# Patient Record
Sex: FEMALE | Race: WHITE | NOT HISPANIC OR LATINO | Employment: FULL TIME | URBAN - METROPOLITAN AREA
[De-identification: names, ages, dates, MRNs, and addresses within clinical notes are randomized per-mention and may not be internally consistent; named-entity substitution may affect disease eponyms.]

---

## 2017-03-13 DIAGNOSIS — I10 ESSENTIAL HYPERTENSION WITH GOAL BLOOD PRESSURE LESS THAN 140/90: ICD-10-CM

## 2017-03-13 NOTE — TELEPHONE ENCOUNTER
Lisinopril -Hydrochlorothiazide  Last Written Prescription Date: 8-18-16  Last Fill Quantity: 90, # refills: 3  Last Office Visit with G, [facility] or [facility] prescribing provider: 8-18-16       Potassium   Date Value Ref Range Status   04/11/2016 3.7 3.4 - 5.3 mmol/L Final     Creatinine   Date Value Ref Range Status   04/11/2016 1.06 (H) 0.52 - 1.04 mg/dL Final     BP Readings from Last 3 Encounters:   08/18/16 100/66   08/18/16 100/66   08/16/16 98/72

## 2017-03-16 RX ORDER — LISINOPRIL/HYDROCHLOROTHIAZIDE 10-12.5 MG
0.5 TABLET ORAL DAILY
Qty: 90 TABLET | Refills: 3 | OUTPATIENT
Start: 2017-03-16

## 2017-03-27 NOTE — TELEPHONE ENCOUNTER
[facility] never received this prescriptin written in August.  LAst February is the last order they received.  Please resend.

## 2017-03-27 NOTE — PROGRESS NOTES
"  SUBJECTIVE:                                                    Ingrid Vickers is a 51 year old female who presents to clinic today for the following health issues:      History of Present Illness   Hypertension:     Outpatient blood pressures:  Are being checked    Blood pressures checked at:  Work    Dietary sodium intake::  Not monitoring salt intake      Problem list and histories reviewed & adjusted, as indicated.  Additional history: as documented    Patient Active Problem List   Diagnosis     Hypertension goal BP (blood pressure) < 140/90     Papanicolaou smear of cervix with low grade squamous intraepithelial lesion (LGSIL)     Past Surgical History:   Procedure Laterality Date     HC ABLATION, ENDOMETRIAL, THERMAL, W/O HYSTEROSCOPIC GUIDANCE       PHOTOREFRACTIVE KERATECTOMY         Social History   Substance Use Topics     Smoking status: Never Smoker     Smokeless tobacco: Never Used      Comment: no smokers in household     Alcohol use Yes      Comment: cocktails x 5/4x per month     Family History   Problem Relation Age of Onset     Hypertension Mother      Genitourinary Problems Mother      Hysterectomy due to pre-cancerous reasons cervical     HEART DISEASE Maternal Grandfather      CEREBROVASCULAR DISEASE Paternal Grandfather      Depression Father      manic-depressive     Psychotic Disorder Father      \"manic\"     Depression Sister      Depression Sister      Psychotic Disorder Son      manic           ROS:  C: NEGATIVE for fever, chills, change in weight  E/M: NEGATIVE for ear, mouth and throat problems  R: NEGATIVE for significant cough or SOB  CV: NEGATIVE for chest pain, palpitations or peripheral edema    OBJECTIVE:                                                    /82  Pulse 80  Temp 98.1  F (36.7  C) (Temporal)  Resp 12  Ht 5' 1.97\" (1.574 m)  Wt 135 lb (61.2 kg)  BMI 24.72 kg/m2  Body mass index is 24.72 kg/(m^2).  Gen: no apparent distress  NECK: no adenopathy, no " asymmetry, no masses  Chest: clear to auscultation without wheeze, rale or rhonchi  Cor: regular rate and rhythm without murmur  Ext: warm and dry without edema  Psych: Alert and oriented times 3; coherent speech, normal   rate and volume, able to articulate logical thoughts, able   to abstract reason, no tangential thoughts, no hallucinations   or delusions  Her affect is neutral        ASSESSMENT/PLAN:                                                      1. 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  BMP drawn today.  Refills given.  Follow up in 1 year.    - BASIC METABOLIC PANEL  - lisinopril-hydrochlorothiazide (PRINZIDE/ZESTORETIC) 10-12.5 MG per tablet; Take 1 tablet by mouth daily  Dispense: 90 tablet; Refill: 3    Lin Lanza MD  [facility]

## 2017-03-27 NOTE — TELEPHONE ENCOUNTER
[facility] is calling to check on the status of this request they were told that there should be refills on file she states they never got this please resend.     Thank you Gloria

## 2017-03-28 ENCOUNTER — TELEPHONE (OUTPATIENT)
Dept: FAMILY MEDICINE | Facility: OTHER | Age: 52
End: 2017-03-28

## 2017-03-28 RX ORDER — LISINOPRIL/HYDROCHLOROTHIAZIDE 10-12.5 MG
0.5 TABLET ORAL DAILY
Qty: 45 TABLET | Refills: 0 | Status: SHIPPED | OUTPATIENT
Start: 2017-03-28 | End: 2017-03-31

## 2017-03-28 NOTE — TELEPHONE ENCOUNTER
Spoke with Pharmacy regarding recent refill of lisinopril-HCTZ at 1/2 tablet. This change was made in August by Maria Teresa Ortiz but the Rx was never filled.     Left message for pt to make her aware of the change as TC refilled the 1/2 tablet.     Routing to TC to make sure this change is still ok as well.  Pharmacy states pt has been taking a full tablet this whole time.

## 2017-03-28 NOTE — TELEPHONE ENCOUNTER
I haven't seen patient since 2011 so was just going by what the chart said.  She should take whatever dose she has been taking and we'll discuss when I see her again on Friday.

## 2017-03-31 ENCOUNTER — OFFICE VISIT (OUTPATIENT)
Dept: FAMILY MEDICINE | Facility: OTHER | Age: 52
End: 2017-03-31
Payer: COMMERCIAL

## 2017-03-31 VITALS
WEIGHT: 135 LBS | DIASTOLIC BLOOD PRESSURE: 82 MMHG | SYSTOLIC BLOOD PRESSURE: 136 MMHG | HEIGHT: 62 IN | HEART RATE: 80 BPM | TEMPERATURE: 98.1 F | RESPIRATION RATE: 12 BRPM | BODY MASS INDEX: 24.84 KG/M2

## 2017-03-31 DIAGNOSIS — I10 HYPERTENSION GOAL BP (BLOOD PRESSURE) < 140/90: Primary | ICD-10-CM

## 2017-03-31 LAB
ANION GAP SERPL CALCULATED.3IONS-SCNC: 6 MMOL/L (ref 3–14)
BUN SERPL-MCNC: 18 MG/DL (ref 7–30)
CALCIUM SERPL-MCNC: 8.9 MG/DL (ref 8.5–10.1)
CHLORIDE SERPL-SCNC: 105 MMOL/L (ref 94–109)
CO2 SERPL-SCNC: 30 MMOL/L (ref 20–32)
CREAT SERPL-MCNC: 1.17 MG/DL (ref 0.52–1.04)
GFR SERPL CREATININE-BSD FRML MDRD: 49 ML/MIN/1.7M2
GLUCOSE SERPL-MCNC: 79 MG/DL (ref 70–99)
POTASSIUM SERPL-SCNC: 4.6 MMOL/L (ref 3.4–5.3)
SODIUM SERPL-SCNC: 141 MMOL/L (ref 133–144)

## 2017-03-31 PROCEDURE — 36415 COLL VENOUS BLD VENIPUNCTURE: CPT | Performed by: FAMILY MEDICINE

## 2017-03-31 PROCEDURE — 99213 OFFICE O/P EST LOW 20 MIN: CPT | Performed by: FAMILY MEDICINE

## 2017-03-31 PROCEDURE — 80048 BASIC METABOLIC PNL TOTAL CA: CPT | Performed by: FAMILY MEDICINE

## 2017-03-31 RX ORDER — LISINOPRIL/HYDROCHLOROTHIAZIDE 10-12.5 MG
1 TABLET ORAL DAILY
Qty: 90 TABLET | Refills: 3 | Status: SHIPPED | OUTPATIENT
Start: 2017-03-31 | End: 2018-04-12

## 2017-03-31 ASSESSMENT — PAIN SCALES - GENERAL: PAINLEVEL: NO PAIN (0)

## 2017-03-31 NOTE — MR AVS SNAPSHOT
"              After Visit Summary   3/31/2017    Ingrid Vickers    MRN: [id number]           Patient Information     Date Of Birth          1965        Visit Information        Provider Department      3/31/2017 10:20 AM Lin Lanza MD [facility]        Today's Diagnoses     Hypertension goal BP (blood pressure) < 140/90    -  1       Follow-ups after your visit        Who to contact     If you have questions or need follow up information about today's clinic visit or your schedule please contact [facility] directly at [phone].  Normal or non-critical lab and imaging results will be communicated to you by [employer], letter or phone within 4 business days after the clinic has received the results. If you do not hear from us within 7 days, please contact the clinic through [employer] or phone. If you have a critical or abnormal lab result, we will notify you by phone as soon as possible.  Submit refill requests through [employer] or call your pharmacy and they will forward the refill request to us. Please allow 3 business days for your refill to be completed.          Additional Information About Your Visit        MyChart Information     [employer] gives you secure access to your electronic health record. If you see a primary care provider, you can also send messages to your care team and make appointments. If you have questions, please call your primary care clinic.  If you do not have a primary care provider, please call [phone] and they will assist you.        Care EveryWhere ID     This is your Care EveryWhere ID. This could be used by other organizations to access your [place] medical records  [id number]        Your Vitals Were     Pulse Temperature Respirations Height BMI (Body Mass Index)       80 98.1  F (36.7  C) (Temporal) 12 5' 1.97\" (1.574 m) 24.72 kg/m2        Blood Pressure from Last 3 Encounters:   03/31/17 136/82   08/18/16 100/66   08/18/16 100/66 "    Weight from Last 3 Encounters:   03/31/17 135 lb (61.2 kg)   08/18/16 133 lb (60.3 kg)   08/18/16 133 lb (60.3 kg)              We Performed the Following     BASIC METABOLIC PANEL          Today's Medication Changes          These changes are accurate as of: 3/31/17 10:27 AM.  If you have any questions, ask your nurse or doctor.               These medicines have changed or have updated prescriptions.        Dose/Directions    lisinopril-hydrochlorothiazide 10-12.5 MG per tablet   Commonly known as:  PRINZIDE/ZESTORETIC   This may have changed:  how much to take   Used for:  Hypertension goal BP (blood pressure) < 140/90   Changed by:  Lin Lanza MD        Dose:  1 tablet   Take 1 tablet by mouth daily   Quantity:  90 tablet   Refills:  3            Where to get your medicines      These medications were sent to [facility] [address] [place] [address]     Phone:  [phone]     lisinopril-hydrochlorothiazide 10-12.5 MG per tablet                Primary Care Provider Office Phone # Fax #    Lin Lanza [phone]       [facility] [address]  [facility] [address]        Thank you!     Thank you for choosing [facility]  for your care. Our goal is always to provide you with excellent care. Hearing back from our patients is one way we can continue to improve our services. Please take a few minutes to complete the written survey that you may receive in the mail after your visit with us. Thank you!             Your Updated Medication List - Protect others around you: Learn how to safely use, store and throw away your medicines at www.disposemymeds.org.          This list is accurate as of: 3/31/17 10:27 AM.  Always use your most recent med list.                   Brand Name Dispense Instructions for use    lisinopril-hydrochlorothiazide 10-12.5 MG per tablet    PRINZIDE/ZESTORETIC     90 tablet    Take 1 tablet by mouth daily

## 2017-10-09 ENCOUNTER — TRANSFERRED RECORDS (OUTPATIENT)
Dept: HEALTH INFORMATION MANAGEMENT | Facility: CLINIC | Age: 52
End: 2017-10-09

## 2017-10-09 ENCOUNTER — NURSE TRIAGE (OUTPATIENT)
Dept: NURSING | Facility: CLINIC | Age: 52
End: 2017-10-09

## 2017-10-10 NOTE — TELEPHONE ENCOUNTER
"  Reason for Disposition    [1] SEVERE pain (e.g., excruciating) AND [2] present > 1 hour     \"About a few hours ago I started having lower right(only) abdominal pain. It feels really heavy and the pain is severe. I felt nauseated all day yesterday.\" Denies other sx. Advised ER.    Additional Information    Negative: Shock suspected (e.g., cold/pale/clammy skin, too weak to stand, low BP, rapid pulse)    Negative: Difficult to awaken or acting confused  (e.g., disoriented, slurred speech)    Negative: Passed out (i.e., lost consciousness, collapsed and was not responding)    Negative: Sounds like a life-threatening emergency to the triager    Negative: Chest pain    Negative: Pain is mainly in upper abdomen  (if needed ask: \"is it mainly above the belly button?\")    Negative: Followed an abdomen (stomach) injury    Negative: [1] Abdominal pain AND [2] pregnant < 20 weeks    Negative: [1] Abdominal pain AND [2] pregnant > 20 weeks    Negative: [1] Abdominal pain AND [2] postpartum < 1 month since delivery    Protocols used: ABDOMINAL PAIN - FEMALE-ADULT-    "

## 2017-10-17 ENCOUNTER — OFFICE VISIT (OUTPATIENT)
Dept: FAMILY MEDICINE | Facility: OTHER | Age: 52
End: 2017-10-17
Payer: COMMERCIAL

## 2017-10-17 VITALS
SYSTOLIC BLOOD PRESSURE: 110 MMHG | TEMPERATURE: 98.6 F | BODY MASS INDEX: 24.72 KG/M2 | HEART RATE: 64 BPM | WEIGHT: 135 LBS | DIASTOLIC BLOOD PRESSURE: 72 MMHG | RESPIRATION RATE: 16 BRPM

## 2017-10-17 DIAGNOSIS — B96.89 BACTERIAL SINUSITIS: Primary | ICD-10-CM

## 2017-10-17 DIAGNOSIS — J32.9 BACTERIAL SINUSITIS: Primary | ICD-10-CM

## 2017-10-17 PROCEDURE — 99213 OFFICE O/P EST LOW 20 MIN: CPT | Performed by: NURSE PRACTITIONER

## 2017-10-17 RX ORDER — AZITHROMYCIN 250 MG/1
TABLET, FILM COATED ORAL
Qty: 6 TABLET | Refills: 0 | Status: SHIPPED | OUTPATIENT
Start: 2017-10-17 | End: 2018-06-19

## 2017-10-17 NOTE — PATIENT INSTRUCTIONS
Please take antibiotic with a probiotic or yogurt (3 small containers) daily not directly with the antibiotic for optimal good bacterial protection. May use saline nasal spray to help thin out and remove mucous. May also increase humidity.     Return to clinic if symptoms worsen or do not improve with treatment    Thank you  Tammy Burr CNP

## 2017-10-17 NOTE — MR AVS SNAPSHOT
After Visit Summary   10/17/2017    Ingrid Vickers    MRN: [id number]           Patient Information     Date Of Birth          1965        Visit Information        Provider Department      10/17/2017 1:20 PM Tammy Burr APRN CNP [facility]        Today's Diagnoses     Bacterial sinusitis    -  1      Care Instructions    Please take antibiotic with a probiotic or yogurt (3 small containers) daily not directly with the antibiotic for optimal good bacterial protection. May use saline nasal spray to help thin out and remove mucous. May also increase humidity.     Return to clinic if symptoms worsen or do not improve with treatment    Thank you  Tammy Burr CNP            Follow-ups after your visit        Who to contact     If you have questions or need follow up information about today's clinic visit or your schedule please contact [facility] directly at [phone].  Normal or non-critical lab and imaging results will be communicated to you by [employer], letter or phone within 4 business days after the clinic has received the results. If you do not hear from us within 7 days, please contact the clinic through [employer] or phone. If you have a critical or abnormal lab result, we will notify you by phone as soon as possible.  Submit refill requests through [employer] or call your pharmacy and they will forward the refill request to us. Please allow 3 business days for your refill to be completed.          Additional Information About Your Visit        MyChart Information     [employer] gives you secure access to your electronic health record. If you see a primary care provider, you can also send messages to your care team and make appointments. If you have questions, please call your primary care clinic.  If you do not have a primary care provider, please call [phone] and they will assist you.        Care EveryWhere ID     This is your Care EveryWhere  ID. This could be used by other organizations to access your [place] medical records  [id number]        Your Vitals Were     Pulse Temperature Respirations BMI (Body Mass Index)          64 98.6  F (37  C) (Oral) 16 24.72 kg/m2         Blood Pressure from Last 3 Encounters:   10/17/17 110/72   03/31/17 136/82   08/18/16 100/66    Weight from Last 3 Encounters:   10/17/17 135 lb (61.2 kg)   03/31/17 135 lb (61.2 kg)   08/18/16 133 lb (60.3 kg)              Today, you had the following     No orders found for display         Today's Medication Changes          These changes are accurate as of: 10/17/17  1:39 PM.  If you have any questions, ask your nurse or doctor.               Start taking these medicines.        Dose/Directions    azithromycin 250 MG tablet   Commonly known as:  ZITHROMAX   Used for:  Bacterial sinusitis   Started by:  Tammy Burr APRN CNP        Two tablets first day, then one tablet daily for four days.   Quantity:  6 tablet   Refills:  0            Where to get your medicines      These medications were sent to [facility] [address] [place] [address]     Phone:  [phone]     azithromycin 250 MG tablet                Primary Care Provider Office Phone # Fax #    Lin FIFI Lanza [phone]       [address] [id number]        Equal Access to Services     [facility] AH: Tim Roman, max martinez, qaenio crandall [facility] campbell. So [facility] [phone].    ATENCIÓN: Si habla español, tiene a willoughby disposición servicios gratuitos de asistencia lingüística. Llame al [phone].    We comply with applicable federal civil rights laws and [place] laws. We do not discriminate on the basis of race, color, national origin, age, disability, sex, sexual orientation, or gender identity.            Thank you!     Thank you for  choosing [facility]  for your care. Our goal is always to provide you with excellent care. Hearing back from our patients is one way we can continue to improve our services. Please take a few minutes to complete the written survey that you may receive in the mail after your visit with us. Thank you!             Your Updated Medication List - Protect others around you: Learn how to safely use, store and throw away your medicines at www.disposemymeds.org.          This list is accurate as of: 10/17/17  1:39 PM.  Always use your most recent med list.                   Brand Name Dispense Instructions for use Diagnosis    azithromycin 250 MG tablet    ZITHROMAX    6 tablet    Two tablets first day, then one tablet daily for four days.    Bacterial sinusitis       lisinopril-hydrochlorothiazide 10-12.5 MG per tablet    PRINZIDE/ZESTORETIC    90 tablet    Take 1 tablet by mouth daily    Hypertension goal BP (blood pressure) < 140/90

## 2017-10-17 NOTE — PROGRESS NOTES
SUBJECTIVE:                                                    Ingrid Vickers is a 52 year old female who presents to clinic today for the following health issues:    HPI    Acute Illness   Acute illness concerns: cold  Onset: Wednesday     Fever: unknown     Chills/Sweats: YES    Headache (location?): no     Sinus Pressure:YES    Conjunctivitis:  YES- pain     Ear Pain: YES    Rhinorrhea: YES    Congestion: YES    Sore Throat: YES     Cough: YES just barely -productive of yellow sputum    Wheeze: no     Decreased Appetite: YES    Nausea: YES    Vomiting: no     Diarrhea:  no     Dysuria/Freq.: no     Fatigue/Achiness: YES    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  Additional history: as documented    BP Readings from Last 3 Encounters:   10/17/17 110/72   03/31/17 136/82   08/18/16 100/66    Wt Readings from Last 3 Encounters:   10/17/17 135 lb (61.2 kg)   03/31/17 135 lb (61.2 kg)   08/18/16 133 lb (60.3 kg)        Labs reviewed in EPIC      ROS:  Constitutional, HEENT, cardiovascular, pulmonary, gi and gu systems are negative, except as otherwise noted.      OBJECTIVE:   /72 (BP Location: Left arm, Patient Position: Chair, Cuff Size: Adult Regular)  Pulse 64  Temp 98.6  F (37  C) (Oral)  Resp 16  Wt 135 lb (61.2 kg)  BMI 24.72 kg/m2  Body mass index is 24.72 kg/(m^2).  GENERAL: alert, no distress, pale and fatigued  EYES: Eyes grossly normal to inspection, PERRL and conjunctivae and sclerae normal  HENT: normal cephalic/atraumatic, right ear: erythematous, nose and mouth without ulcers or lesions, nasal mucosa edematous , rhinorrhea yellow and green, oropharynx clear, oral mucous membranes moist and tonsillar erythema  NECK: no adenopathy, no asymmetry, masses, or scars and thyroid normal to palpation  NECK: bilateral anterior cervical adenopathy, no asymmetry, masses, or scars and  thyroid normal to palpation  RESP: lungs clear to auscultation - no rales, rhonchi or wheezes  CV: regular rate and rhythm, normal S1 S2, no S3 or S4, no murmur, click or rub, no peripheral edema and peripheral pulses strong  ABDOMEN: soft, nontender, no hepatosplenomegaly, no masses and bowel sounds normal  MS: no gross musculoskeletal defects noted, no edema    Diagnostic Test Results:  none     ASSESSMENT/PLAN:     1. Bacterial sinusitis  Given length and symptoms will treat with antibiotic and home care reviewed  - azithromycin (ZITHROMAX) 250 MG tablet; Two tablets first day, then one tablet daily for four days.  Dispense: 6 tablet; Refill: 0    Home care instructions were reviewed with the patient. The risks, benefits and treatment options of prescribed medications or other treatments have been discussed with the patient. The patient verbalized their understanding and should call or follow up if no improvement or if they develop further problems.  Return to clinic prn  Patient Instructions   Please take antibiotic with a probiotic or yogurt (3 small containers) daily not directly with the antibiotic for optimal good bacterial protection. May use saline nasal spray to help thin out and remove mucous. May also increase humidity.     Return to clinic if symptoms worsen or do not improve with treatment    Thank you  EVIE Mcgovern CNP [facility]

## 2017-10-17 NOTE — NURSING NOTE
"Chief Complaint   Patient presents with     Sick       Initial /72 (BP Location: Left arm, Patient Position: Chair, Cuff Size: Adult Regular)  Pulse 64  Temp 98.6  F (37  C) (Oral)  Resp 16  Wt 135 lb (61.2 kg)  BMI 24.72 kg/m2 Estimated body mass index is 24.72 kg/(m^2) as calculated from the following:    Height as of 3/31/17: 5' 1.97\" (1.574 m).    Weight as of this encounter: 135 lb (61.2 kg).  Medication Reconciliation: complete    "

## 2018-01-17 ENCOUNTER — MYC MEDICAL ADVICE (OUTPATIENT)
Dept: FAMILY MEDICINE | Facility: OTHER | Age: 53
End: 2018-01-17

## 2018-01-17 ENCOUNTER — E-VISIT (OUTPATIENT)
Dept: FAMILY MEDICINE | Facility: OTHER | Age: 53
End: 2018-01-17
Payer: COMMERCIAL

## 2018-01-17 DIAGNOSIS — R05.9 COUGH: Primary | ICD-10-CM

## 2018-01-17 PROCEDURE — 99207 ZZC NO BILLABLE SERVICE THIS VISIT: CPT | Performed by: FAMILY MEDICINE

## 2018-01-19 NOTE — TELEPHONE ENCOUNTER
Aly on cell phone and sent patient mychart emssage regarding Dr. Bernal can see her at 3:20p today. Please schedule if patient would like.

## 2018-04-12 DIAGNOSIS — I10 HYPERTENSION GOAL BP (BLOOD PRESSURE) < 140/90: ICD-10-CM

## 2018-04-12 RX ORDER — LISINOPRIL/HYDROCHLOROTHIAZIDE 10-12.5 MG
TABLET ORAL
Qty: 30 TABLET | Refills: 0 | Status: SHIPPED | OUTPATIENT
Start: 2018-04-12 | End: 2018-06-19

## 2018-04-12 NOTE — TELEPHONE ENCOUNTER
Lisinopril-hydrochlorothiazide    Medication is being filled for 1 time refill only due to:  Future labs ordered BMP. Due of OV     Please help schedule    Krystle Hoang RN, BSN

## 2018-04-13 NOTE — TELEPHONE ENCOUNTER
LM for patient to return phone call to clinic about message below.  Maddison Dorsey CMA ([facility])

## 2018-05-31 ENCOUNTER — TELEPHONE (OUTPATIENT)
Dept: FAMILY MEDICINE | Facility: OTHER | Age: 53
End: 2018-05-31

## 2018-05-31 ENCOUNTER — MYC MEDICAL ADVICE (OUTPATIENT)
Dept: FAMILY MEDICINE | Facility: OTHER | Age: 53
End: 2018-05-31

## 2018-05-31 DIAGNOSIS — Z12.11 COLON CANCER SCREENING: Primary | ICD-10-CM

## 2018-05-31 NOTE — TELEPHONE ENCOUNTER
Colonoscopy pended - she is due for it. Please review/sign in TC absence.    She is due for an office visit - last OV 10/2017 but I can let her know that when I MyChart back once order is placed.  Whitney Terrazas, CMA

## 2018-05-31 NOTE — TELEPHONE ENCOUNTER
Reason for Call: Request for an order or referral:    Order or referral being requested: colonoscopy screening     Date needed: as soon as possible    Has the patient been seen by the PCP for this problem? YES    Additional comments: AM please    Phone number Patient can be reached at:  Home number on file [phone] (home)    Best Time:  any    Can we leave a detailed message on this number?  YES    Call taken on 5/31/2018 at 9:33 AM by Carline Del Rosario

## 2018-06-04 ENCOUNTER — TELEPHONE (OUTPATIENT)
Dept: FAMILY MEDICINE | Facility: CLINIC | Age: 53
End: 2018-06-04

## 2018-06-04 NOTE — TELEPHONE ENCOUNTER
Reason for Call:  Other appointment    Detailed comments: Patient was calling to schedule colonoscopy     Phone Number Patient can be reached at: Home number on file [phone] (home)    Best Time: ANY    Can we leave a detailed message on this number? YES    Call taken on 6/4/2018 at 4:18 PM by Krystle Stoner

## 2018-06-14 ENCOUNTER — ANESTHESIA EVENT (OUTPATIENT)
Dept: GASTROENTEROLOGY | Facility: CLINIC | Age: 53
End: 2018-06-14
Payer: COMMERCIAL

## 2018-06-14 ENCOUNTER — ANESTHESIA (OUTPATIENT)
Dept: GASTROENTEROLOGY | Facility: CLINIC | Age: 53
End: 2018-06-14
Payer: COMMERCIAL

## 2018-06-14 ENCOUNTER — SURGERY (OUTPATIENT)
Age: 53
End: 2018-06-14

## 2018-06-14 ENCOUNTER — HOSPITAL ENCOUNTER (OUTPATIENT)
Facility: CLINIC | Age: 53
Discharge: HOME | End: 2018-06-14
Attending: SURGERY | Admitting: SURGERY
Payer: COMMERCIAL

## 2018-06-14 VITALS
DIASTOLIC BLOOD PRESSURE: 83 MMHG | SYSTOLIC BLOOD PRESSURE: 116 MMHG | OXYGEN SATURATION: 100 % | RESPIRATION RATE: 16 BRPM | TEMPERATURE: 98.1 F

## 2018-06-14 LAB — COLONOSCOPY: NORMAL

## 2018-06-14 PROCEDURE — 25000128 H RX IP 250 OP 636: Performed by: NURSE ANESTHETIST, CERTIFIED REGISTERED

## 2018-06-14 PROCEDURE — 88305 TISSUE EXAM BY PATHOLOGIST: CPT | Performed by: SURGERY

## 2018-06-14 PROCEDURE — 25000125 ZZHC RX 250: Performed by: NURSE ANESTHETIST, CERTIFIED REGISTERED

## 2018-06-14 PROCEDURE — 88305 TISSUE EXAM BY PATHOLOGIST: CPT | Mod: 26 | Performed by: SURGERY

## 2018-06-14 PROCEDURE — 40000296 ZZH STATISTIC ENDO RECOVERY CLASS 1:2 FIRST HOUR: Performed by: SURGERY

## 2018-06-14 PROCEDURE — 45385 COLONOSCOPY W/LESION REMOVAL: CPT | Mod: PT | Performed by: SURGERY

## 2018-06-14 PROCEDURE — 45385 COLONOSCOPY W/LESION REMOVAL: CPT | Performed by: SURGERY

## 2018-06-14 RX ORDER — NALOXONE HYDROCHLORIDE 0.4 MG/ML
.1-.4 INJECTION, SOLUTION INTRAMUSCULAR; INTRAVENOUS; SUBCUTANEOUS
Status: DISCONTINUED | OUTPATIENT
Start: 2018-06-14 | End: 2018-06-14 | Stop reason: HOSPADM

## 2018-06-14 RX ORDER — SODIUM CHLORIDE, SODIUM LACTATE, POTASSIUM CHLORIDE, CALCIUM CHLORIDE 600; 310; 30; 20 MG/100ML; MG/100ML; MG/100ML; MG/100ML
INJECTION, SOLUTION INTRAVENOUS CONTINUOUS
Status: DISCONTINUED | OUTPATIENT
Start: 2018-06-14 | End: 2018-06-14 | Stop reason: HOSPADM

## 2018-06-14 RX ORDER — ONDANSETRON 2 MG/ML
4 INJECTION INTRAMUSCULAR; INTRAVENOUS EVERY 6 HOURS PRN
Status: DISCONTINUED | OUTPATIENT
Start: 2018-06-14 | End: 2018-06-14 | Stop reason: HOSPADM

## 2018-06-14 RX ORDER — PROPOFOL 10 MG/ML
INJECTION, EMULSION INTRAVENOUS CONTINUOUS PRN
Status: DISCONTINUED | OUTPATIENT
Start: 2018-06-14 | End: 2018-06-14

## 2018-06-14 RX ORDER — ONDANSETRON 4 MG/1
4 TABLET, ORALLY DISINTEGRATING ORAL EVERY 30 MIN PRN
Status: DISCONTINUED | OUTPATIENT
Start: 2018-06-14 | End: 2018-06-14

## 2018-06-14 RX ORDER — ALBUTEROL SULFATE 0.83 MG/ML
2.5 SOLUTION RESPIRATORY (INHALATION) EVERY 4 HOURS PRN
Status: DISCONTINUED | OUTPATIENT
Start: 2018-06-14 | End: 2018-06-14 | Stop reason: HOSPADM

## 2018-06-14 RX ORDER — ONDANSETRON 2 MG/ML
4 INJECTION INTRAMUSCULAR; INTRAVENOUS EVERY 30 MIN PRN
Status: DISCONTINUED | OUTPATIENT
Start: 2018-06-14 | End: 2018-06-14

## 2018-06-14 RX ORDER — LIDOCAINE 40 MG/G
CREAM TOPICAL
Status: DISCONTINUED | OUTPATIENT
Start: 2018-06-14 | End: 2018-06-14 | Stop reason: HOSPADM

## 2018-06-14 RX ORDER — PROPOFOL 10 MG/ML
INJECTION, EMULSION INTRAVENOUS PRN
Status: DISCONTINUED | OUTPATIENT
Start: 2018-06-14 | End: 2018-06-14

## 2018-06-14 RX ORDER — FLUMAZENIL 0.1 MG/ML
0.2 INJECTION, SOLUTION INTRAVENOUS
Status: DISCONTINUED | OUTPATIENT
Start: 2018-06-14 | End: 2018-06-14 | Stop reason: HOSPADM

## 2018-06-14 RX ORDER — ONDANSETRON 4 MG/1
4 TABLET, ORALLY DISINTEGRATING ORAL EVERY 6 HOURS PRN
Status: DISCONTINUED | OUTPATIENT
Start: 2018-06-14 | End: 2018-06-14 | Stop reason: HOSPADM

## 2018-06-14 RX ORDER — LIDOCAINE HYDROCHLORIDE 20 MG/ML
INJECTION, SOLUTION INFILTRATION; PERINEURAL PRN
Status: DISCONTINUED | OUTPATIENT
Start: 2018-06-14 | End: 2018-06-14

## 2018-06-14 RX ORDER — ONDANSETRON 2 MG/ML
4 INJECTION INTRAMUSCULAR; INTRAVENOUS
Status: DISCONTINUED | OUTPATIENT
Start: 2018-06-14 | End: 2018-06-14 | Stop reason: HOSPADM

## 2018-06-14 RX ADMIN — PROPOFOL 150 MCG/KG/MIN: 10 INJECTION, EMULSION INTRAVENOUS at 08:25

## 2018-06-14 RX ADMIN — LIDOCAINE HYDROCHLORIDE 40 MG: 20 INJECTION, SOLUTION INFILTRATION; PERINEURAL at 08:25

## 2018-06-14 RX ADMIN — PROPOFOL 50 MG: 10 INJECTION, EMULSION INTRAVENOUS at 08:25

## 2018-06-14 RX ADMIN — LIDOCAINE HYDROCHLORIDE 0.5 ML: 10 INJECTION, SOLUTION EPIDURAL; INFILTRATION; INTRACAUDAL; PERINEURAL at 07:48

## 2018-06-14 RX ADMIN — PROPOFOL 30 MG: 10 INJECTION, EMULSION INTRAVENOUS at 08:30

## 2018-06-14 RX ADMIN — PROPOFOL 50 MG: 10 INJECTION, EMULSION INTRAVENOUS at 08:28

## 2018-06-14 RX ADMIN — SODIUM CHLORIDE, POTASSIUM CHLORIDE, SODIUM LACTATE AND CALCIUM CHLORIDE: 600; 310; 30; 20 INJECTION, SOLUTION INTRAVENOUS at 07:48

## 2018-06-14 NOTE — IP AVS SNAPSHOT
[facility]    [address] [address] [facility] [address]    Phone:  [phone]                                       After Visit Summary   6/14/2018    Ingrid Vickers    MRN: [id number]           After Visit Summary Signature Page     I have received my discharge instructions, and my questions have been answered. I have discussed any challenges I see with this plan with the nurse or doctor.    ..........................................................................................................................................  Patient/Patient Representative Signature      ..........................................................................................................................................  Patient Representative Print Name and Relationship to Patient    ..................................................               ................................................  Date                                            Time    ..........................................................................................................................................  Reviewed by Signature/Title    ...................................................              ..............................................  Date                                                            Time

## 2018-06-14 NOTE — ANESTHESIA CARE TRANSFER NOTE
Patient: Ingrid Vickers    Procedure(s):  COLONOSCOPY with polypectomy by snare - Wound Class: II-Clean Contaminated    Diagnosis: Colon cancer screening  Diagnosis Additional Information: No value filed.    Anesthesia Type:   MAC     Note:  Airway :Face Mask  Patient transferred to:Phase II  Handoff Report: Identifed the Patient, Identified the Reponsible Provider, Reviewed the pertinent medical history, Discussed the surgical course, Reviewed Intra-OP anesthesia mangement and issues during anesthesia, Set expectations for post-procedure period and Allowed opportunity for questions and acknowledgement of understanding      Vitals: (Last set prior to Anesthesia Care Transfer)    CRNA VITALS  6/14/2018 0819 - 6/14/2018 0902      6/14/2018             Pulse: 69    SpO2: 98 %    Resp Rate (observed): 17                Electronically Signed By: EVIE Santos CRNA  June 14, 2018  9:02 AM

## 2018-06-14 NOTE — DISCHARGE INSTRUCTIONS
[facility] [place]    Home Care Following Endoscopy          Activity:    You have just undergone an endoscopic procedure usually performed with conscious sedation.  Do not work or operate machinery (including a car) for at least 12 hours.      I encourage you to walk and attempt to pass this air as soon as possible.    Diet:    Return to the diet you were on before your procedure but eat lightly for the first 12-24 hours.    Drink plenty of water.    Resume any regular medications unless otherwise advised by your physician.  Please begin any new medication prescribed as a result of your procedure as directed by your physician.     If you had any biopsy or polyp removed please refrain from aspirin or aspirin products for 2 days.  If on Coumadin please restart as instructed by your physician.   Pain:    You may take Tylenol as needed for pain.  Expected Recovery:    You can expect some mild abdominal fullness and/or discomfort due to the air used to inflate your intestinal tract. It is also normal to have a mild sore throat after upper endoscopy.    Call Your Physician if You Have:    After Colonoscopy:  o Worsening persisting abdominal pain which is worse with activity.  o Fevers (>101 degrees F), chills or shakes.  o Passage of continued blood with bowel movements.   Any questions or concerns about your recovery, please call [phone] or after hours [phone] Nurse Advice Line.    Follow-up Care:    You should receive a call or letter with your results within 1 week. Please call if you have not received a notification of your results.    If asked to return to clinic please make an appointment 1 week after your procedure.  Call [phone].     Same-Day Surgery   Adult Discharge Orders & Instructions     For 24 hours after surgery    1. Get plenty of rest.  A responsible adult must stay with you for at least 24 hours after you leave the hospital.   2. Do not drive or use heavy equipment.  If you have  weakness or tingling, don't drive or use heavy equipment until this feeling goes away.  3. Do not drink alcohol.  4. Avoid strenuous or risky activities.  Ask for help when climbing stairs.   5. You may feel lightheaded.  IF so, sit for a few minutes before standing.  Have someone help you get up.   6. You may have a slight fever. Call the doctor if your fever is over 100 F (37.7 C) (taken under the tongue) or lasts longer than 24 hours.  7. You may have a dry mouth, a sore throat, muscle aches or trouble sleeping.  These should go away after 24 hours.  8. Do not make important or legal decisions.  Based on the surgery/procedure that you had today, we do not anticipate that you will have any problems.  However, given the various responses that patients can have to the surgical experience, we want to ensure that you have information available to manage pain or nausea and what to do if you observe bleeding or you develop any signs and symptoms of infection:  Methods to control pain include:  Prescription pain medication or over the counter medications as prescribed or suggested by your physician.  In addition, ice packs and periods of rest are often helpful.  If your pain is not managed with the above methods, contact your physician.  Methods to control nausea include:  Anti-nausea medication approved by your physician.  Drink clear liquids such as apple juice, ginger ale, broth or 7-Up. Be sure to drink enough fluids.  Move to a regular diet as you feel able.  Rest may also help.  Bleeding:  It's not uncommon to see a little blood staining on the dressing, about the size of a quarter in the first 24 hours; if you see this, there is no reason to be alarmed.  However, should this continue to increase in size, apply pressure if able, ant notify your physician.  Infection:  We do not anticipate that you will acquire an infection, but if you should experience any of the following symptoms:  redness, swelling, heat,  increasing pain or abnormal drainage at your surgery site, fever or chills, please notify your physician.    Call your doctor for any of the followin.  Signs of infection (fever, growing tenderness at the surgery site, a large amount of drainage or bleeding, severe pain, foul-smelling drainage, redness, swelling).    2. It has been over 8 to 10 hours since surgery and you are still not able to urinate (pass water).      Nurse advice line: [phone]

## 2018-06-14 NOTE — H&P
"Patient seen for screening colonoscopy  HPI:  Patient is a 52 year old female with no active GI issues here for screening colonoscopy. She doesn't take any blood thinners. She denies significant family history of colon issues. No rectal bleeding or abdominal pain. No CVA or MI.    Review Of Systems    Skin: negative  Ears/Nose/Throat: negative  Respiratory: No shortness of breath, dyspnea on exertion, cough, or hemoptysis  Cardiovascular: negative  Gastrointestinal: negative  Genitourinary: negative  Musculoskeletal: negative  Neurologic: negative  Hematologic/Lymphatic/Immunologic: negative  Endocrine: negative      Past Medical History:   Diagnosis Date     ASCUS favor benign 4/19/00     Papanicolaou smear of cervix with low grade squamous intraepithelial lesion (LGSIL)      Thoracic or lumbosacral neuritis or radiculitis, unspecified 2001     Transient hypertension of pregnancy, antepartum      Unspecified asthma(493.90)     Hx for wheezing and asthma     Unspecified hemorrhoids without mention of complication        Past Surgical History:   Procedure Laterality Date     HC ABLATION, ENDOMETRIAL, THERMAL, W/O HYSTEROSCOPIC GUIDANCE       PHOTOREFRACTIVE KERATECTOMY         Family History   Problem Relation Age of Onset     Hypertension Mother      Genitourinary Problems Mother      Hysterectomy due to pre-cancerous reasons cervical     HEART DISEASE Maternal Grandfather      CEREBROVASCULAR DISEASE Paternal Grandfather      Depression Father      manic-depressive     Psychotic Disorder Father      \"manic\"     Depression Sister      Depression Sister      Psychotic Disorder Son      manic       Social History     Social History     Marital status:      Spouse name: Elijah     Number of children: 2     Years of education: 27     Occupational History      [employer]     Social History Main Topics     Smoking status: Never Smoker     Smokeless tobacco: Never Used      Comment: " no smokers in household     Alcohol use Yes      Comment: cocktails x 5/4x per month     Drug use: No     Sexual activity: Yes     Partners: Male     Birth control/ protection: Surgical      Comment: vasectomy     Other Topics Concern     Not on file     Social History Narrative       No current outpatient prescriptions on file.       Medications and history reviewed    Physical exam:  Vitals: /77  Temp 98.1  F (36.7  C) (Oral)  Resp 16  SpO2 98%  BMI= There is no height or weight on file to calculate BMI.    Constitutional: Healthy, alert, non-distressed  Head: Normo-cephalic, atraumatic, no lesions, masses or tenderness  Cardiovascular: RRR, no new murmurs, +S1, +S2 heart sounds, no clicks, rubs or gallops   Respiratory: CTAB, no rales, rhonchi or wheezing, equal chest rise, good respiratory effort   Gastrointestinal: Soft, non-tender, non distended, no rebound rigidity or guarding, no masses or hernias palpated   : Deferred  Musculoskeletal: Moves all extremities, normal  strength, no deformities noted   Skin: No suspicious lesions or rashes   Psychiatric: Mentation appears normal, affect appropriate   Hematologic/Lymphatic/Immunologic: Normal cervical and supraclavicular lymph nodes   Patient able to get up on table without difficulty.    Labs show:  No results found for this or any previous visit (from the past 24 hour(s)).        Assessment: screening colonoscopy  Plan: ok to proceed with screening colonoscopy with MAC sedation.    Elijah Van DO

## 2018-06-14 NOTE — ANESTHESIA POSTPROCEDURE EVALUATION
Patient: Ingrid Vickers    Procedure(s):  COLONOSCOPY with polypectomy by snare - Wound Class: II-Clean Contaminated    Diagnosis:Colon cancer screening  Diagnosis Additional Information: No value filed.    Anesthesia Type:  MAC    Note:  Anesthesia Post Evaluation    Patient location during evaluation: Phase 2  Patient participation: Able to fully participate in evaluation  Level of consciousness: awake  Pain management: adequate  Airway patency: patent  Cardiovascular status: acceptable and hemodynamically stable  Respiratory status: acceptable, room air and nonlabored ventilation  Hydration status: stable  PONV: none     Anesthetic complications: None    Comments: Patient was happy with the anesthesia care received and no anesthesia related complications were noted.  I will follow up with the patient again if it is needed.        Last vitals:  Vitals:    06/14/18 0730   BP: 101/77   Resp: 16   Temp: 98.1  F (36.7  C)   SpO2: 98%         Electronically Signed By: EVIE Santos CRNA  June 14, 2018  9:02 AM

## 2018-06-14 NOTE — LETTER
Ingrid Vickers  [address] [facility] [address]    June 20, 2018      Dear Ingrid,  This letter is to inform you of the results of your pathology report from your colonoscopy.  Your pathology report was:  FINAL DIAGNOSIS:   Rectal/sigmoid colon, mucosal biopsy:   - Hyperplastic polyp and a portion of normal colonic mucosa.    This is a benign polyp with no concerning features. I recommend repeating a screening colonoscopy in 10 years.  If you have further questions please don t hesitate to call our clinic at [phone].   Sincerely,     Elijah Van, DO

## 2018-06-14 NOTE — ANESTHESIA PREPROCEDURE EVALUATION
Anesthesia Evaluation     . Pt has had prior anesthetic.     No history of anesthetic complications          ROS/MED HX    ENT/Pulmonary:  - neg pulmonary ROS     Neurologic:  - neg neurologic ROS     Cardiovascular:     (+) hypertension----. : . . . :. . Previous cardiac testing date:results:date: results:ECG reviewed date:4-14-03 results:nsr date: results:          METS/Exercise Tolerance:     Hematologic:         Musculoskeletal:         GI/Hepatic:  - neg GI/hepatic ROS      (-) GERD   Renal/Genitourinary:  - ROS Renal section negative       Endo:  - neg endo ROS       Psychiatric:  - neg psychiatric ROS       Infectious Disease:  - neg infectious disease ROS       Malignancy:      - no malignancy   Other:    (+) No chance of pregnancy                    Physical Exam  Normal systems: cardiovascular, pulmonary and dental    Airway   Mallampati: II  TM distance: >3 FB  Neck ROM: full    Dental     Cardiovascular   Rhythm and rate: regular and normal      Pulmonary    breath sounds clear to auscultation                    Anesthesia Plan      History & Physical Review  History and physical reviewed and following examination; no interval change.    ASA Status:  2 .    NPO Status:  > 8 hours    Plan for MAC with Intravenous and Propofol induction. Maintenance will be TIVA.  Reason for MAC:  Deep or markedly invasive procedure (G8)    I listened to Dr Van perform the H&P.  No difference from my interview      Postoperative Care      Consents  Anesthetic plan, risks, benefits and alternatives discussed with:  Patient.  Use of blood products discussed: No .   .                          .

## 2018-06-14 NOTE — IP AVS SNAPSHOT
MRN:[id number]                      After Visit Summary   6/14/2018    Ingrid Vickers    MRN: [id number]           Thank you!     Thank you for choosing [facility] for your care. Our goal is always to provide you with excellent care. Hearing back from our patients is one way we can continue to improve our services. Please take a few minutes to complete the written survey that you may receive in the mail after you visit with us. Thank you!        Patient Information     Date Of Birth          1965        About your hospital stay     You were admitted on:  June 14, 2018 You last received care in the:  [facility]    You were discharged on:  June 14, 2018       Who to Call     For medical emergencies, please call 911.  For non-urgent questions about your medical care, please call your primary care provider or clinic, [phone]  For questions related to your surgery, please call your surgery clinic        Attending Provider     Provider Elijah Bravo,  Surgery       Primary Care Provider Office Phone # Fax #    Lin Lanza [phone]      Your next 10 appointments already scheduled     Jun 19, 2018  1:40 PM CDT   Office Visit with Lin Lanza MD   [facility] ([facility]    [address] [place] [id number] [phone]           Bring a current list of meds and any records pertaining to this visit. For Physicals, please bring immunization records and any forms needing to be filled out. Please arrive 10 minutes early to complete paperwork.            Jun 19, 2018  2:30 PM CDT   MA SCREENING DIGITAL BILATERAL with ERMA1   [facility] ([facility])    [address] [facility] [id number] [phone]           Do not use any powder, lotion or deodorant under your arms or on your breast. If you do, we will ask you to remove it  before your exam.  Wear comfortable, two-piece clothing.  If you have any allergies, tell your care team.  Bring any previous mammograms from other facilities or have them mailed to the breast center.              Further instructions from your care team         [facility]    Home Care Following Endoscopy          Activity:    You have just undergone an endoscopic procedure usually performed with conscious sedation.  Do not work or operate machinery (including a car) for at least 12 hours.      I encourage you to walk and attempt to pass this air as soon as possible.    Diet:    Return to the diet you were on before your procedure but eat lightly for the first 12-24 hours.    Drink plenty of water.    Resume any regular medications unless otherwise advised by your physician.  Please begin any new medication prescribed as a result of your procedure as directed by your physician.     If you had any biopsy or polyp removed please refrain from aspirin or aspirin products for 2 days.  If on Coumadin please restart as instructed by your physician.   Pain:    You may take Tylenol as needed for pain.  Expected Recovery:    You can expect some mild abdominal fullness and/or discomfort due to the air used to inflate your intestinal tract. It is also normal to have a mild sore throat after upper endoscopy.    Call Your Physician if You Have:    After Colonoscopy:  o Worsening persisting abdominal pain which is worse with activity.  o Fevers (>101 degrees F), chills or shakes.  o Passage of continued blood with bowel movements.   Any questions or concerns about your recovery, please call [phone] or after hours [phone] Nurse Advice Line.    Follow-up Care:    You should receive a call or letter with your results within 1 week. Please call if you have not received a notification of your results.    If asked to return to clinic please make an appointment 1 week after your procedure.  Call [phone].      Same-Day Surgery   Adult Discharge Orders & Instructions     For 24 hours after surgery    1. Get plenty of rest.  A responsible adult must stay with you for at least 24 hours after you leave the hospital.   2. Do not drive or use heavy equipment.  If you have weakness or tingling, don't drive or use heavy equipment until this feeling goes away.  3. Do not drink alcohol.  4. Avoid strenuous or risky activities.  Ask for help when climbing stairs.   5. You may feel lightheaded.  IF so, sit for a few minutes before standing.  Have someone help you get up.   6. You may have a slight fever. Call the doctor if your fever is over 100 F (37.7 C) (taken under the tongue) or lasts longer than 24 hours.  7. You may have a dry mouth, a sore throat, muscle aches or trouble sleeping.  These should go away after 24 hours.  8. Do not make important or legal decisions.  Based on the surgery/procedure that you had today, we do not anticipate that you will have any problems.  However, given the various responses that patients can have to the surgical experience, we want to ensure that you have information available to manage pain or nausea and what to do if you observe bleeding or you develop any signs and symptoms of infection:  Methods to control pain include:  Prescription pain medication or over the counter medications as prescribed or suggested by your physician.  In addition, ice packs and periods of rest are often helpful.  If your pain is not managed with the above methods, contact your physician.  Methods to control nausea include:  Anti-nausea medication approved by your physician.  Drink clear liquids such as apple juice, ginger ale, broth or 7-Up. Be sure to drink enough fluids.  Move to a regular diet as you feel able.  Rest may also help.  Bleeding:  It's not uncommon to see a little blood staining on the dressing, about the size of a quarter in the first 24 hours; if you see this, there is no reason to be alarmed.   However, should this continue to increase in size, apply pressure if able, ant notify your physician.  Infection:  We do not anticipate that you will acquire an infection, but if you should experience any of the following symptoms:  redness, swelling, heat, increasing pain or abnormal drainage at your surgery site, fever or chills, please notify your physician.    Call your doctor for any of the followin.  Signs of infection (fever, growing tenderness at the surgery site, a large amount of drainage or bleeding, severe pain, foul-smelling drainage, redness, swelling).    2. It has been over 8 to 10 hours since surgery and you are still not able to urinate (pass water).      Nurse advice line: [phone]          Pending Results     Date and Time Order Name Status Description    2018 0844 Surgical pathology exam In process             Admission Information     Date & Time Provider Department Dept. Phone    2018 Elijah Van,  [facility] [phone]      Your Vitals Were     Blood Pressure Temperature Respirations Pulse Oximetry          101/77 98.1  F (36.7  C) (Oral) 16 98%        MyChart Information     [employer] gives you secure access to your electronic health record. If you see a primary care provider, you can also send messages to your care team and make appointments. If you have questions, please call your primary care clinic.  If you do not have a primary care provider, please call [phone] and they will assist you.        Care EveryWhere ID     This is your Care EveryWhere ID. This could be used by other organizations to access your [facility] medical records  [id number]        Equal Access to Services     [facility]: Hadii richelle romeroo Souche, waaxda luqadaha, qaybta kaalmada miky, enio campbell. So [facility] [phone].    ATENCIÓN: Si habla español, tiene a willoughby disposición servicios gratuitos de asistencia lingüística.  Ede carreon [phone].    We comply with applicable federal civil rights laws and [place] laws. We do not discriminate on the basis of race, color, national origin, age, disability, sex, sexual orientation, or gender identity.               Review of your medicines      UNREVIEWED medicines. Ask your doctor about these medicines        Dose / Directions    azithromycin 250 MG tablet   Commonly known as:  ZITHROMAX   Used for:  Bacterial sinusitis        Two tablets first day, then one tablet daily for four days.   Quantity:  6 tablet   Refills:  0         CONTINUE these medicines which have NOT CHANGED        Dose / Directions    lisinopril-hydrochlorothiazide 10-12.5 MG per tablet   Commonly known as:  PRINZIDE/ZESTORETIC   Used for:  Hypertension goal BP (blood pressure) < 140/90        TAKE ONE TABLET BY MOUTH ONE TIME DAILY   Quantity:  30 tablet   Refills:  0                Protect others around you: Learn how to safely use, store and throw away your medicines at www.disposemymeds.org.             Medication List: This is a list of all your medications and when to take them. Check marks below indicate your daily home schedule. Keep this list as a reference.      Medications           Morning Afternoon Evening Bedtime As Needed    azithromycin 250 MG tablet   Commonly known as:  ZITHROMAX   Two tablets first day, then one tablet daily for four days.                                lisinopril-hydrochlorothiazide 10-12.5 MG per tablet   Commonly known as:  PRINZIDE/ZESTORETIC   TAKE ONE TABLET BY MOUTH ONE TIME DAILY

## 2018-06-18 LAB — COPATH REPORT: NORMAL

## 2018-06-19 ENCOUNTER — RADIANT APPOINTMENT (OUTPATIENT)
Dept: MAMMOGRAPHY | Facility: OTHER | Age: 53
End: 2018-06-19
Payer: COMMERCIAL

## 2018-06-19 ENCOUNTER — OFFICE VISIT (OUTPATIENT)
Dept: FAMILY MEDICINE | Facility: OTHER | Age: 53
End: 2018-06-19
Payer: COMMERCIAL

## 2018-06-19 VITALS
RESPIRATION RATE: 16 BRPM | TEMPERATURE: 97.8 F | WEIGHT: 134 LBS | OXYGEN SATURATION: 100 % | DIASTOLIC BLOOD PRESSURE: 82 MMHG | SYSTOLIC BLOOD PRESSURE: 106 MMHG | HEIGHT: 62 IN | BODY MASS INDEX: 24.66 KG/M2 | HEART RATE: 70 BPM

## 2018-06-19 DIAGNOSIS — I10 HYPERTENSION GOAL BP (BLOOD PRESSURE) < 140/90: Primary | ICD-10-CM

## 2018-06-19 DIAGNOSIS — Z12.31 VISIT FOR SCREENING MAMMOGRAM: ICD-10-CM

## 2018-06-19 DIAGNOSIS — R63.5 WEIGHT GAIN: ICD-10-CM

## 2018-06-19 DIAGNOSIS — N95.1 PERIMENOPAUSAL: ICD-10-CM

## 2018-06-19 LAB
ANION GAP SERPL CALCULATED.3IONS-SCNC: 10 MMOL/L (ref 3–14)
BUN SERPL-MCNC: 13 MG/DL (ref 7–30)
CALCIUM SERPL-MCNC: 8 MG/DL (ref 8.5–10.1)
CHLORIDE SERPL-SCNC: 104 MMOL/L (ref 94–109)
CO2 SERPL-SCNC: 26 MMOL/L (ref 20–32)
CREAT SERPL-MCNC: 1.06 MG/DL (ref 0.52–1.04)
GFR SERPL CREATININE-BSD FRML MDRD: 55 ML/MIN/1.7M2
GLUCOSE SERPL-MCNC: 86 MG/DL (ref 70–99)
POTASSIUM SERPL-SCNC: 3.8 MMOL/L (ref 3.4–5.3)
SODIUM SERPL-SCNC: 140 MMOL/L (ref 133–144)
TSH SERPL DL<=0.005 MIU/L-ACNC: 1.2 MU/L (ref 0.4–4)

## 2018-06-19 PROCEDURE — 99213 OFFICE O/P EST LOW 20 MIN: CPT | Performed by: FAMILY MEDICINE

## 2018-06-19 PROCEDURE — 80048 BASIC METABOLIC PNL TOTAL CA: CPT | Performed by: FAMILY MEDICINE

## 2018-06-19 PROCEDURE — 36415 COLL VENOUS BLD VENIPUNCTURE: CPT | Performed by: FAMILY MEDICINE

## 2018-06-19 PROCEDURE — 77067 SCR MAMMO BI INCL CAD: CPT | Mod: TC

## 2018-06-19 PROCEDURE — 84443 ASSAY THYROID STIM HORMONE: CPT | Performed by: FAMILY MEDICINE

## 2018-06-19 RX ORDER — LISINOPRIL/HYDROCHLOROTHIAZIDE 10-12.5 MG
1 TABLET ORAL DAILY
Qty: 90 TABLET | Refills: 3 | Status: SHIPPED | OUTPATIENT
Start: 2018-06-19 | End: 2019-08-13

## 2018-06-19 ASSESSMENT — PAIN SCALES - GENERAL: PAINLEVEL: NO PAIN (0)

## 2018-06-19 NOTE — MR AVS SNAPSHOT
After Visit Summary   6/19/2018    Ingrid Vickers    MRN: [id number]           Patient Information     Date Of Birth          1965        Visit Information        Provider Department      6/19/2018 1:40 PM Lin Lanza MD [facility]        Today's Diagnoses     Hypertension goal BP (blood pressure) < 140/90           Follow-ups after your visit        Your next 10 appointments already scheduled     Jun 19, 2018  2:30 PM CDT   MA SCREENING DIGITAL BIL[facility]AL with [facility]   [facility]    [address] [facility] [id number] [phone]           Do not use any powder, lotion or deodorant under your arms or on your breast. If you do, we will ask you to remove it before your exam.  Wear comfortable, two-piece clothing.  If you have any allergies, tell your care team.  Bring any previous mammograms from other facilities or have them mailed to the breast center.              Who to contact     If you have questions or need follow up information about today's clinic visit or your schedule please contact [facility] directly at [phone].  Normal or non-critical lab and imaging results will be communicated to you by MyChart, letter or phone within 4 business days after the clinic has received the results. If you do not hear from us within 7 days, please contact the clinic through [employer] or phone. If you have a critical or abnormal lab result, we will notify you by phone as soon as possible.  Submit refill requests through [employer] or call your pharmacy and they will forward the refill request to us. Please allow 3 business days for your refill to be completed.          Additional Information About Your Visit        MyChart Information     [employer] gives you secure access to your electronic health record. If you see a primary care provider, you can also send messages to your care team and make appointments. If  "you have questions, please call your primary care clinic.  If you do not have a primary care provider, please call [phone] and they will assist you.        Care EveryWhere ID     This is your Care EveryWhere ID. This could be used by other organizations to access your [facility] medical records  [id number]        Your Vitals Were     Pulse Temperature Respirations Height Pulse Oximetry BMI (Body Mass Index)    70 97.8  F (36.6  C) (Temporal) 16 5' 1.9\" (1.572 m) 100% 24.59 kg/m2       Blood Pressure from Last 3 Encounters:   06/19/18 106/82   06/14/18 116/83   10/17/17 110/72    Weight from Last 3 Encounters:   06/19/18 134 lb (60.8 kg)   10/17/17 135 lb (61.2 kg)   03/31/17 135 lb (61.2 kg)              We Performed the Following     Basic metabolic panel  (Ca, Cl, CO2, Creat, Gluc, K, Na, BUN)     TSH with free T4 reflex          Today's Medication Changes          These changes are accurate as of 6/19/18  2:19 PM.  If you have any questions, ask your nurse or doctor.               These medicines have changed or have updated prescriptions.        Dose/Directions    lisinopril-hydrochlorothiazide 10-12.5 MG per tablet   Commonly known as:  PRINZIDE/ZESTORETIC   This may have changed:  See the new instructions.   Used for:  Hypertension goal BP (blood pressure) < 140/90   Changed by:  Lin Lanza MD        Dose:  1 tablet   Take 1 tablet by mouth daily   Quantity:  90 tablet   Refills:  3            Where to get your medicines      These medications were sent to [facility] [address] [facility] [address] [facility]  [address] [facility] [address]     Phone:  [phone]     lisinopril-hydrochlorothiazide 10-12.5 MG per tablet                Primary Care Provider Office Phone # Fax #    Lin Lanza [phone]       [address] [facility] [address]  [facility] [address]        Equal Access to Services     BOO DALAL AH: max iKm, " celina skeltonkaylipanfilo lineelam ediliain hayaan adeeg kharash la'aan ah. So [facility] [phone].    ATENCIÓN: Si macario blake, tiene a willoughby disposición servicios gratuitos de asistencia lingüística. Ede al [phone].    We comply with applicable federal civil rights laws and [place] laws. We do not discriminate on the basis of race, color, national origin, age, disability, sex, sexual orientation, or gender identity.            Thank you!     Thank you for choosing [facility]  for your care. Our goal is always to provide you with excellent care. Hearing back from our patients is one way we can continue to improve our services. Please take a few minutes to complete the written survey that you may receive in the mail after your visit with us. Thank you!             Your Updated Medication List - Protect others around you: Learn how to safely use, store and throw away your medicines at www.disposemymeds.org.          This list is accurate as of 6/19/18  2:19 PM.  Always use your most recent med list.                   Brand Name Dispense Instructions for use Diagnosis    lisinopril-hydrochlorothiazide 10-12.5 MG per tablet    PRINZIDE/ZESTORETIC    90 tablet    Take 1 tablet by mouth daily    Hypertension goal BP (blood pressure) < 140/90

## 2019-07-28 NOTE — PROGRESS NOTES
"  SUBJECTIVE:   Ingrid Vickers is a 52 year old female who presents to clinic today for the following health issues:      History of Present Illness     Hypertension:     Outpatient blood pressures:  Are being checked    Blood pressures checked at:  Work    Dietary sodium intake::  Not monitoring salt intake    Weight gain - has gained 5 lbs in 9 months. Has been working out and watching what she eats pretty strictly, and so this is bothering.     Perimenopause - irregular, light periods. Has gone up to 3 months without period, has it currently. Hot flashes, not life limiting. A little more irritability, maybe. This has been happening almost 1 year.     Problem list and histories reviewed & adjusted, as indicated.  Additional history: as documented      Patient Active Problem List   Diagnosis     Hypertension goal BP (blood pressure) < 140/90     Papanicolaou smear of cervix with low grade squamous intraepithelial lesion (LGSIL)     Past Surgical History:   Procedure Laterality Date     HC ABLATION, ENDOMETRIAL, THERMAL, W/O HYSTEROSCOPIC GUIDANCE       PHOTOREFRACTIVE KERATECTOMY         Social History   Substance Use Topics     Smoking status: Never Smoker     Smokeless tobacco: Never Used      Comment: no smokers in household     Alcohol use Yes      Comment: cocktails x 5/4x per month     Family History   Problem Relation Age of Onset     Hypertension Mother      Genitourinary Problems Mother      Hysterectomy due to pre-cancerous reasons cervical     HEART DISEASE Maternal Grandfather      Cerebrovascular Disease Paternal Grandfather      Depression Father      manic-depressive     Psychotic Disorder Father      \"manic\"     Depression Sister      Depression Sister      Psychotic Disorder Son      manic         Current Outpatient Prescriptions   Medication Sig Dispense Refill     lisinopril-hydrochlorothiazide (PRINZIDE/ZESTORETIC) 10-12.5 MG per tablet Take 1 tablet by mouth daily 90 tablet 3     " LFTs stable  Continue to monitor with daily labs  Continue thiamine, folic acid, and multivitamin  Continue nadolol      "[DISCONTINUED] lisinopril-hydrochlorothiazide (PRINZIDE/ZESTORETIC) 10-12.5 MG per tablet TAKE ONE TABLET BY MOUTH ONE TIME DAILY  30 tablet 0     BP Readings from Last 3 Encounters:   06/19/18 106/82   06/14/18 116/83   10/17/17 110/72    Wt Readings from Last 3 Encounters:   06/19/18 134 lb (60.8 kg)   10/17/17 135 lb (61.2 kg)   03/31/17 135 lb (61.2 kg)              ROS:  CONSTITUTIONAL: NEGATIVE for fever, chills, change in weight  ENT/MOUTH: NEGATIVE for ear, mouth and throat problems  RESP: NEGATIVE for significant cough or SOB  CV: NEGATIVE for chest pain, palpitations or peripheral edema    OBJECTIVE:     /82 (BP Location: Left arm, Patient Position: Chair, Cuff Size: Adult Regular)  Pulse 70  Temp 97.8  F (36.6  C) (Temporal)  Resp 16  Ht 5' 1.9\" (1.572 m)  Wt 134 lb (60.8 kg)  SpO2 100%  BMI 24.59 kg/m2  Body mass index is 24.59 kg/(m^2).  GENERAL: healthy, alert and no distress  RESP: lungs clear to auscultation - no rales, rhonchi or wheezes  CV: regular rate and rhythm, normal S1 S2, no S3 or S4, no murmur, click or rub, no peripheral edema  ABDOMEN: soft, nontender  MS: no gross musculoskeletal defects noted, no edema    Diagnostic Test Results:  No results found for this or any previous visit (from the past 24 hour(s)).    ASSESSMENT/PLAN:     1. Hypertension goal BP (blood pressure) < 140/90  Well controlled on lisinopril-HCTZ. Will get BMP today and pending results, will continue current regimen.   - Basic metabolic panel  (Ca, Cl, CO2, Creat, Gluc, K, Na, BUN)  - TSH with free T4 reflex  - lisinopril-hydrochlorothiazide (PRINZIDE/ZESTORETIC) 10-12.5 MG per tablet; Take 1 tablet by mouth daily  Dispense: 90 tablet; Refill: 3    2. Weight gain  Patient has gained 5 lbs in 9 months despite increasing her exercise and managing her diet. Likely some aspect of this is muscle gain, as she is doing more strength training. Also likely hormonal changes playing a role. Will test TSH as " hypothyroid could cause weight gain as well.       3. Perimenopausal  Patient experiencing lighter more irregular periods for 1 year. She also reports mild hot flashes. She declines any intervention or management of perimenopause.       This document serves as a record of the services and decisions personally performed and made by Lin Lanza MD.  It was created on his/her behalf by Renee Titus, a trained medical student and scribe.  The creation of this record is based on the provider's personal observations and the statements of the patient.     Renee Titus, MS4  June 19, 2018      This patient was seen and examined by myself as well as the medical student.  The medical student has scribed the note and I have reviewed it, edited it appropriately and agree with the final documentation. Electronically signed by Lin Lanza MD on 6/19/2018    [facility]

## 2019-08-13 DIAGNOSIS — I10 HYPERTENSION GOAL BP (BLOOD PRESSURE) < 140/90: ICD-10-CM

## 2019-08-14 NOTE — TELEPHONE ENCOUNTER
Prinzide  Routing refill request to provider for review/approval because:  Patient needs to be seen because it has been more than 1 year since last office visit.    Rosa Brand, RN, BSN

## 2019-08-15 RX ORDER — LISINOPRIL/HYDROCHLOROTHIAZIDE 10-12.5 MG
1 TABLET ORAL DAILY
Qty: 30 TABLET | Refills: 2 | Status: SHIPPED | OUTPATIENT
Start: 2019-08-15 | End: 2019-08-20

## 2019-08-15 NOTE — PROGRESS NOTES
"Subjective     Ingrid Vickers is a 53 year old female who presents to clinic today for the following health issues:    History of Present Illness        Hypertension: She presents for follow up of hypertension.  She does check blood pressure  regularly outside of the clinic. Outpatient blood pressures have not been over 140/90. She does not follow a low salt diet.     She eats 4 or more servings of fruits and vegetables daily.She consumes 0 sweetened beverage(s) daily.  She is taking medications regularly.     Patient Active Problem List   Diagnosis     Hypertension goal BP (blood pressure) < 140/90     Papanicolaou smear of cervix with low grade squamous intraepithelial lesion (LGSIL)     CKD (chronic kidney disease) stage 3, GFR 30-59 ml/min (H)     Past Surgical History:   Procedure Laterality Date     HC ABLATION, ENDOMETRIAL, THERMAL, W/O HYSTEROSCOPIC GUIDANCE       PHOTOREFRACTIVE KERATECTOMY         Social History     Tobacco Use     Smoking status: Never Smoker     Smokeless tobacco: Never Used     Tobacco comment: no smokers in household   Substance Use Topics     Alcohol use: Yes     Comment: cocktails x 5/4x per month     Family History   Problem Relation Age of Onset     Hypertension Mother      Genitourinary Problems Mother         Hysterectomy due to pre-cancerous reasons cervical     Heart Disease Maternal Grandfather      Cerebrovascular Disease Paternal Grandfather      Depression Father         manic-depressive     Psychotic Disorder Father         \"manic\"     Depression Sister      Depression Sister      Psychotic Disorder Son         manic           Reviewed and updated as needed this visit by Provider  Tobacco  Allergies  Problems         Review of Systems   ROS COMP: CONSTITUTIONAL: NEGATIVE for fever, chills, change in weight  ENT/MOUTH: NEGATIVE for ear, mouth and throat problems  RESP: NEGATIVE for significant cough or SOB  CV: NEGATIVE for chest pain, palpitations or peripheral " "edema      Objective    BP (!) 116/92 (BP Location: Left arm, Patient Position: Chair, Cuff Size: Adult Regular)   Pulse 87   Temp 99  F (37.2  C) (Temporal)   Resp 16   Ht 1.575 m (5' 2\")   Wt 61.7 kg (136 lb)   SpO2 99%   BMI 24.87 kg/m    Body mass index is 24.87 kg/m .  Physical Exam   Gen: no apparent distress  Chest: clear to auscultation without wheeze, rale or rhonchi  Cor: regular rate and rhythm without murmur  Ext: warm and dry without edema  Psych: Alert and oriented times 3; coherent speech, normal   rate and volume, able to articulate logical thoughts, able   to abstract reason, no tangential thoughts, no hallucinations   or delusions  Her affect is neutral        Assessment & Plan     1. Hypertension goal BP (blood pressure) < 140/90  Diastolic blood pressure is elevated today in the same on recheck.  She has not been checking her blood pressures recently.  She is able to do this at work and work starts again next week.  She will send these to me by my chart.  Discussed that she may also come into our pharmacy to have her blood pressure rechecked.  If her diastolic remains elevated would then adjust her medications, however her systolic is ran low and would like to verify that her diastolic remains elevated before adjusting her medications which could cause her to be orthostatic.    - Lipid panel reflex to direct LDL Non-fasting  - BASIC METABOLIC PANEL  - lisinopril-hydrochlorothiazide (PRINZIDE/ZESTORETIC) 10-12.5 MG tablet; Take 1 tablet by mouth daily  Dispense: 90 tablet; Refill: 3    2. CKD (chronic kidney disease) stage 3, GFR 30-59 ml/min (H)  Discussed chronic kidney disease and the importance of blood pressure control as well as avoiding NSAIDs.  Discussed that if she needed anything for discomfort to use Tylenol.    - Lipid panel reflex to direct LDL Non-fasting  - BASIC METABOLIC PANEL     Lin Lanza MD  [facility]"

## 2019-08-20 ENCOUNTER — OFFICE VISIT (OUTPATIENT)
Dept: FAMILY MEDICINE | Facility: OTHER | Age: 54
End: 2019-08-20
Payer: COMMERCIAL

## 2019-08-20 VITALS
RESPIRATION RATE: 16 BRPM | HEIGHT: 62 IN | BODY MASS INDEX: 25.03 KG/M2 | OXYGEN SATURATION: 99 % | HEART RATE: 87 BPM | WEIGHT: 136 LBS | TEMPERATURE: 99 F | DIASTOLIC BLOOD PRESSURE: 92 MMHG | SYSTOLIC BLOOD PRESSURE: 116 MMHG

## 2019-08-20 DIAGNOSIS — N18.30 CKD (CHRONIC KIDNEY DISEASE) STAGE 3, GFR 30-59 ML/MIN (H): ICD-10-CM

## 2019-08-20 DIAGNOSIS — I10 HYPERTENSION GOAL BP (BLOOD PRESSURE) < 140/90: Primary | ICD-10-CM

## 2019-08-20 LAB
ANION GAP SERPL CALCULATED.3IONS-SCNC: 7 MMOL/L (ref 3–14)
BUN SERPL-MCNC: 16 MG/DL (ref 7–30)
CALCIUM SERPL-MCNC: 8.4 MG/DL (ref 8.5–10.1)
CHLORIDE SERPL-SCNC: 107 MMOL/L (ref 94–109)
CHOLEST SERPL-MCNC: 231 MG/DL
CO2 SERPL-SCNC: 27 MMOL/L (ref 20–32)
CREAT SERPL-MCNC: 0.98 MG/DL (ref 0.52–1.04)
GFR SERPL CREATININE-BSD FRML MDRD: 66 ML/MIN/{1.73_M2}
GLUCOSE SERPL-MCNC: 93 MG/DL (ref 70–99)
HDLC SERPL-MCNC: 65 MG/DL
LDLC SERPL CALC-MCNC: 120 MG/DL
NONHDLC SERPL-MCNC: 166 MG/DL
POTASSIUM SERPL-SCNC: 4 MMOL/L (ref 3.4–5.3)
SODIUM SERPL-SCNC: 141 MMOL/L (ref 133–144)
TRIGL SERPL-MCNC: 232 MG/DL

## 2019-08-20 PROCEDURE — 99214 OFFICE O/P EST MOD 30 MIN: CPT | Performed by: FAMILY MEDICINE

## 2019-08-20 PROCEDURE — 36415 COLL VENOUS BLD VENIPUNCTURE: CPT | Performed by: FAMILY MEDICINE

## 2019-08-20 PROCEDURE — 80048 BASIC METABOLIC PNL TOTAL CA: CPT | Performed by: FAMILY MEDICINE

## 2019-08-20 PROCEDURE — 80061 LIPID PANEL: CPT | Performed by: FAMILY MEDICINE

## 2019-08-20 RX ORDER — LISINOPRIL/HYDROCHLOROTHIAZIDE 10-12.5 MG
1 TABLET ORAL DAILY
Qty: 90 TABLET | Refills: 3 | Status: SHIPPED | OUTPATIENT
Start: 2019-08-20 | End: 2020-06-22

## 2019-08-20 ASSESSMENT — MIFFLIN-ST. JEOR: SCORE: 1185.14

## 2019-09-27 ENCOUNTER — HEALTH MAINTENANCE LETTER (OUTPATIENT)
Age: 54
End: 2019-09-27

## 2020-02-04 ENCOUNTER — MYC MEDICAL ADVICE (OUTPATIENT)
Dept: FAMILY MEDICINE | Facility: OTHER | Age: 55
End: 2020-02-04

## 2020-02-04 NOTE — TELEPHONE ENCOUNTER
Next 5 appointments (look out 90 days)    Feb 13, 2020  5:30 PM CST  MyChart Short with Alaina Dorantes PA-C  [facility] [address] [place] [address] [address]  [facility] [id number]  [phone]

## 2020-02-11 ENCOUNTER — TELEPHONE (OUTPATIENT)
Dept: FAMILY MEDICINE | Facility: OTHER | Age: 55
End: 2020-02-11

## 2020-02-11 NOTE — TELEPHONE ENCOUNTER
Huddled with ES, patient should see OBGYN to discuss this, also spoke to Corie who had spoken to CDL and asked to have patient rescheduled. This is something that could be managed by OBGYN for treatment and further recommendations. Rosa Sharon Day has plenty of openings Thursday night, please reschedule patient.   Kaelyn Nick MA

## 2020-02-11 NOTE — TELEPHONE ENCOUNTER
Reason for Call:  Other returning call    Detailed comments: Patient calling stating she got a call saying she had to reschedule. Patient would like a call back to discuss why she cant see CDL. Patient states that she just wants some tests done to check her sex drive and was told a family practice provider could do this. Please advise.    Phone Number Patient can be reached at: Cell number on file:    Telephone Information:   Mobile [phone]       Best Time: Any    Can we leave a detailed message on this number? YES    Call taken on 2/11/2020 at 4:17 PM by Tana Villanueva

## 2020-02-12 NOTE — TELEPHONE ENCOUNTER
Agree with below. KV also agrees. Patient should be seen by GYN. This is not something we typically manage in family practice. Patient was misadvised.     Please reschedule.     Elkin Ferrer-LUCIEN Sanches  [facility]

## 2020-02-13 ENCOUNTER — OFFICE VISIT (OUTPATIENT)
Dept: OBGYN | Facility: OTHER | Age: 55
End: 2020-02-13
Payer: COMMERCIAL

## 2020-02-13 VITALS
DIASTOLIC BLOOD PRESSURE: 92 MMHG | SYSTOLIC BLOOD PRESSURE: 142 MMHG | BODY MASS INDEX: 26.02 KG/M2 | WEIGHT: 142.25 LBS | HEART RATE: 80 BPM

## 2020-02-13 DIAGNOSIS — R68.82 LOW LIBIDO: Primary | ICD-10-CM

## 2020-02-13 PROCEDURE — 99203 OFFICE O/P NEW LOW 30 MIN: CPT | Performed by: ADVANCED PRACTICE MIDWIFE

## 2020-02-14 NOTE — PROGRESS NOTES
Ingrid Vickers is a 54 year old who presents to the clinic for evaluation of low libido  Has been a problem on and off for the past 10 years.  Has years when it is fine and years when it isn't  Has discussed this with multiple providers in the past.   Wants to discuss hormone testing and medication   Continues to have periods.  They have always been irregular.  May go 6 weeks to 3 months.   Has had a sleep study.  Feels that she sleeps well and wakes rested.   Wants to have a return to the sexual desire she had when she was younger.   States this is a problem for her and her .   Not interested in counseling.     Histories reviewed and updated  Past Medical History:   Diagnosis Date     ASCUS favor benign 4/19/00     Papanicolaou smear of cervix with low grade squamous intraepithelial lesion (LGSIL)      Thoracic or lumbosacral neuritis or radiculitis, unspecified 2001     Transient hypertension of pregnancy, antepartum      Unspecified asthma(493.90)     Hx for wheezing and asthma     Unspecified hemorrhoids without mention of complication      Past Surgical History:   Procedure Laterality Date     HC ABLATION, ENDOMETRIAL, THERMAL, W/O HYSTEROSCOPIC GUIDANCE       PHOTOREFRACTIVE KERATECTOMY       Social History     Socioeconomic History     Marital status:      Spouse name: Elijah     Number of children: 2     Years of education: 27     Highest education level: Not on file   Occupational History     Occupation:      Employer: [employer]   Social Needs     Financial resource strain: Not on file     Food insecurity:     Worry: Not on file     Inability: Not on file     Transportation needs:     Medical: Not on file     Non-medical: Not on file   Tobacco Use     Smoking status: Never Smoker     Smokeless tobacco: Never Used     Tobacco comment: no smokers in household   Substance and Sexual Activity     Alcohol use: Yes     Comment: cocktails x 5/4x per month     Drug  "use: No     Sexual activity: Yes     Partners: Male     Birth control/protection: Surgical     Comment: vasectomy   Lifestyle     Physical activity:     Days per week: Not on file     Minutes per session: Not on file     Stress: Not on file   Relationships     Social connections:     Talks on phone: Not on file     Gets together: Not on file     Attends Yarsani service: Not on file     Active member of club or organization: Not on file     Attends meetings of clubs or organizations: Not on file     Relationship status: Not on file     Intimate partner violence:     Fear of current or ex partner: Not on file     Emotionally abused: Not on file     Physically abused: Not on file     Forced sexual activity: Not on file   Other Topics Concern     Parent/sibling w/ CABG, MI or angioplasty before 65F 55M? Not Asked   Social History Narrative     Not on file     Family History   Problem Relation Age of Onset     Hypertension Mother      Genitourinary Problems Mother         Hysterectomy due to pre-cancerous reasons cervical     Heart Disease Maternal Grandfather      Cerebrovascular Disease Paternal Grandfather      Depression Father         manic-depressive     Psychotic Disorder Father         \"manic\"     Depression Sister      Depression Sister      Psychotic Disorder Son         manic          ROS: 10 point ROS neg other than the symptoms noted above in the HPI.    EXAM:  BP (!) 142/92 (BP Location: Left arm, Patient Position: Sitting, Cuff Size: Adult Regular)   Pulse 80   Wt 64.5 kg (142 lb 4 oz)   LMP 01/13/2020 (Approximate)   BMI 26.02 kg/m    GENERAL:  Pleasant female, no acute distress  EYES: sclera clear  RESP: breathing unlabored  CV: extremities without edema  M/S: no gross deformities  Neuro:  normal strength and sensation        ASSESSMENT/PLAN:    (R68.82) Low libido  (primary encounter diagnosis)  Comment:   Plan:     We reviewed the general causes of low libido and the issues more associated with " perimenopause.   Does not have vaginal dryness.   Enjoys sex.  Reviewed the fallacy around testing hormones.  Has had a TSH checked in the last two years and has no new symptoms.   Checked drug prices for her and she finds the cost is prohibitive.   She is not interested in hormonal supplementation.   May consider one of the sexual health clinics.   .           Visit length 30 minutes was spent face to face with the patient today discussing her history, diagnosis, and follow-up plan as noted above.  Over 50% of the visit was spent in counseling and coordination of care.    Time noted does not include time required to complete procedures.

## 2020-03-19 ENCOUNTER — MYC MEDICAL ADVICE (OUTPATIENT)
Dept: FAMILY MEDICINE | Facility: OTHER | Age: 55
End: 2020-03-19

## 2020-05-29 NOTE — PROGRESS NOTES
"Subjective     Ingridsa CHERY Vickers is a 54 year old female who presents to clinic today for the following health issues:    HPI     Joint Pain    Onset: 1 month    Description:   Location: right knee on the outside  Character: Dull ache    Intensity: mild    Progression of Symptoms: same    Accompanying Signs & Symptoms:  Other symptoms: none    History:   Previous similar pain: no       Precipitating factors:   Trauma or overuse: YES- has been sitting cross-legged on her chair at home and thinks she might have overextended a ligament or something - sore when standing up but once she's moving it seems to get better. Can't bring heel to butt.    Alleviating factors:  Improved by: Ibuprofen, elevation - she converted her workspace at home to a standup desk. If she lays on her right side with no pressure     Therapies Tried and outcome: Ibuprofen which did help    Patient Active Problem List   Diagnosis     Hypertension goal BP (blood pressure) < 140/90     Papanicolaou smear of cervix with low grade squamous intraepithelial lesion (LGSIL)     CKD (chronic kidney disease) stage 3, GFR 30-59 ml/min (H)     Past Surgical History:   Procedure Laterality Date     HC ABLATION, ENDOMETRIAL, THERMAL, W/O HYSTEROSCOPIC GUIDANCE       PHOTOREFRACTIVE KERATECTOMY         Social History     Tobacco Use     Smoking status: Never Smoker     Smokeless tobacco: Never Used     Tobacco comment: no smokers in household   Substance Use Topics     Alcohol use: Yes     Comment: cocktails x 5/4x per month     Family History   Problem Relation Age of Onset     Hypertension Mother      Genitourinary Problems Mother         Hysterectomy due to pre-cancerous reasons cervical     Heart Disease Maternal Grandfather      Cerebrovascular Disease Paternal Grandfather      Depression Father         manic-depressive     Psychotic Disorder Father         \"manic\"     Depression Sister      Depression Sister      Psychotic Disorder Son         manic     " "    Current Outpatient Medications   Medication Sig Dispense Refill     lisinopril-hydrochlorothiazide (PRINZIDE/ZESTORETIC) 10-12.5 MG tablet Take 1 tablet by mouth daily 90 tablet 3     Allergies   Allergen Reactions     Verapamil      Rash       Reviewed and updated as needed this visit by Provider         Review of Systems   Constitutional, HEENT, cardiovascular, pulmonary, gi and gu systems are negative, except as otherwise noted.      Objective    /64   Pulse 86   Temp 97.5  F (36.4  C) (Temporal)   Resp 16   Ht 1.583 m (5' 2.32\")   SpO2 97%   BMI 25.75 kg/m    Body mass index is 25.75 kg/m .  Physical Exam   GENERAL: healthy, alert and no distress  MS: no obvious deformity of the right knee. No erythema, edema or ecchymosis. Tenderness over the pes anserinus bursa of the right knee. Full ROM but pain with flexion. No instability appreciated. No calf tenderness. Distal pulses intact.   SKIN: no suspicious lesions or rashes  PSYCH: mentation appears normal, affect normal/bright    Diagnostic Test Results:  Labs reviewed in Epic  none         Assessment & Plan     1. Pes anserinus bursitis of right knee  Symptoms very consistent with bursitis. Likely given her change of work routine and sitting cross-legged. Stretching exercises printed and provided to patient today. Continue supportive cares. Patient will follow-up in clinic if new symptoms develop or current symptoms fail to improve.    The patient indicates understanding of these issues and agrees with the plan.    Catherine Cruz PA-C  [facility]    "

## 2020-06-02 ENCOUNTER — OFFICE VISIT (OUTPATIENT)
Dept: FAMILY MEDICINE | Facility: OTHER | Age: 55
End: 2020-06-02
Payer: COMMERCIAL

## 2020-06-02 VITALS
DIASTOLIC BLOOD PRESSURE: 64 MMHG | HEART RATE: 86 BPM | TEMPERATURE: 97.5 F | BODY MASS INDEX: 25.75 KG/M2 | RESPIRATION RATE: 16 BRPM | HEIGHT: 62 IN | OXYGEN SATURATION: 97 % | SYSTOLIC BLOOD PRESSURE: 108 MMHG

## 2020-06-02 DIAGNOSIS — M70.51 PES ANSERINUS BURSITIS OF RIGHT KNEE: Primary | ICD-10-CM

## 2020-06-02 PROCEDURE — 99213 OFFICE O/P EST LOW 20 MIN: CPT | Performed by: PHYSICIAN ASSISTANT

## 2020-06-02 ASSESSMENT — PAIN SCALES - GENERAL: PAINLEVEL: MILD PAIN (3)

## 2020-06-22 DIAGNOSIS — I10 HYPERTENSION GOAL BP (BLOOD PRESSURE) < 140/90: ICD-10-CM

## 2020-06-22 RX ORDER — LISINOPRIL/HYDROCHLOROTHIAZIDE 10-12.5 MG
1 TABLET ORAL DAILY
Qty: 60 TABLET | Refills: 0 | Status: SHIPPED | OUTPATIENT
Start: 2020-06-22 | End: 2020-08-28

## 2020-06-22 NOTE — TELEPHONE ENCOUNTER
Pending Prescriptions:                       Disp   Refills    lisinopril-hydrochlorothiazide (ZESTORETI*90 tab*3            Sig: Take 1 tablet by mouth daily    Prescription approved per [facility] Refill Protocol.  BMP due 8/2020. Routed to [facility] Registration.    Mariya Wiseman, BSN, RN, PHN

## 2020-06-23 NOTE — TELEPHONE ENCOUNTER
Called patient and left a message. Need to schedule patient for lab visit  before further refills per note below. Thank you

## 2020-06-23 NOTE — TELEPHONE ENCOUNTER
Spoke to patient and she is currently in [place] as her son had emergency surgery. Nona is unsure when she will be back.    Shawnee Haywood, CMA

## 2020-07-14 ENCOUNTER — VIRTUAL VISIT (OUTPATIENT)
Dept: FAMILY MEDICINE | Facility: OTHER | Age: 55
End: 2020-07-14
Payer: COMMERCIAL

## 2020-07-14 DIAGNOSIS — L50.9 HIVES: Primary | ICD-10-CM

## 2020-07-14 PROCEDURE — 99213 OFFICE O/P EST LOW 20 MIN: CPT | Mod: 95 | Performed by: FAMILY MEDICINE

## 2020-07-14 RX ORDER — PREDNISONE 20 MG/1
TABLET ORAL
Qty: 20 TABLET | Refills: 0 | Status: SHIPPED | OUTPATIENT
Start: 2020-07-14 | End: 2020-09-29

## 2020-07-14 ASSESSMENT — PAIN SCALES - GENERAL: PAINLEVEL: NO PAIN (0)

## 2020-07-14 NOTE — PROGRESS NOTES
"Ingrid Vickers is a 54 year old female who is being evaluated via a billable video visit.      The patient has been notified of following:     \"This video visit will be conducted via a call between you and your physician/provider. We have found that certain health care needs can be provided without the need for an in-person physical exam.  This service lets us provide the care you need with a video conversation.  If a prescription is necessary we can send it directly to your pharmacy.  If lab work is needed we can place an order for that and you can then stop by our lab to have the test done at a later time.    Video visits are billed at different rates depending on your insurance coverage.  Please reach out to your insurance provider with any questions.    If during the course of the call the physician/provider feels a video visit is not appropriate, you will not be charged for this service.\"    Patient has given verbal consent for Video visit? Yes  How would you like to obtain your AVS? Des  Patient would like the video invitation sent by: Text to cell phone: [phone]  Will anyone else be joining your video visit? No    Subjective     Ingrid Vickers is a 54 year old female who presents today via video visit for the following health issues:    HPI  Rash  Onset: 2 days    Description:   Location: back, knees, legs, arms and now facial   Character: raised, red, swelling, warm to the touch   Itching (Pruritis): YES    Progression of Symptoms:  \"not going away\"     Accompanying Signs & Symptoms:  Fever: no   Body aches or joint pain: no   Sore throat symptoms: no   Recent cold symptoms: no     History:   Previous similar rash: no     Precipitating factors:   Exposure to similar rash: no   New exposures: \"wasps bite left achilles heal\" about 12 days ago   Recent travel: no     Alleviating factors:      Therapies Tried and outcome: benadryl, baking soda bath       Video Start Time: 8:23    Patient Active " "Problem List   Diagnosis     Hypertension goal BP (blood pressure) < 140/90     Papanicolaou smear of cervix with low grade squamous intraepithelial lesion (LGSIL)     CKD (chronic kidney disease) stage 3, GFR 30-59 ml/min (H)     Past Surgical History:   Procedure Laterality Date     BREAST SURGERY  2013    Augmentation     HC ABLATION, ENDOMETRIAL, THERMAL, W/O HYSTEROSCOPIC GUIDANCE       PHOTOREFRACTIVE KERATECTOMY         Social History     Tobacco Use     Smoking status: Never Smoker     Smokeless tobacco: Never Used     Tobacco comment: no smokers in household   Substance Use Topics     Alcohol use: Yes     Comment: few times a week     Family History   Problem Relation Age of Onset     Hypertension Mother      Genitourinary Problems Mother         Hysterectomy due to pre-cancerous reasons cervical     Heart Disease Maternal Grandfather      Cerebrovascular Disease Paternal Grandfather      Depression Father         manic-depressive     Psychotic Disorder Father         \"manic\"     Mental Illness Father         Bi polar     Depression Sister      Depression Sister      Psychotic Disorder Son         manic     Depression Sister      Anxiety Disorder Sister      Substance Abuse Son         In recovery for almost 3 years from Heroin     Clotting Disorder Son      Other - See Comments Son 25        blood clots in the brain          Current Outpatient Medications   Medication Sig Dispense Refill     lisinopril-hydrochlorothiazide (ZESTORETIC) 10-12.5 MG tablet Take 1 tablet by mouth daily 60 tablet 0     predniSONE (DELTASONE) 20 MG tablet Take 3 tabs by mouth daily x 3 days, then 2 tabs daily x 3 days, then 1 tab daily x 3 days, then 1/2 tab daily x 3 days. 20 tablet 0     Allergies   Allergen Reactions     Verapamil      Rash     BP Readings from Last 3 Encounters:   06/02/20 108/64   02/13/20 (!) 142/92   08/20/19 (!) 116/92    Wt Readings from Last 3 Encounters:   02/13/20 64.5 kg (142 lb 4 oz)   08/20/19 61.7 " kg (136 lb)   06/19/18 60.8 kg (134 lb)                    Reviewed and updated as needed this visit by Provider         Review of Systems   Constitutional, HEENT, cardiovascular, pulmonary, gi and gu systems are negative, except as otherwise noted.      Objective             Physical Exam     GENERAL: Healthy, alert and no distress  EYES: Eyes grossly normal to inspection.  No discharge or erythema, or obvious scleral/conjunctival abnormalities.  RESP: No audible wheeze, cough, or visible cyanosis.  No visible retractions or increased work of breathing.    SKIN: Visible skin clear. Rash inv loving thighs, hands and back.  NEURO: Cranial nerves grossly intact.  Mentation and speech appropriate for age.  PSYCH: Mentation appears normal, affect normal/bright, judgement and insight intact, normal speech and appearance well-groomed.      Diagnostic Test Results:  Labs reviewed in Epic        Assessment & Plan   Problem List Items Addressed This Visit     None      Visit Diagnoses     Hives    -  Primary    Relevant Medications    predniSONE (DELTASONE) 20 MG tablet      likely allergic rash to an environmental exposure  Discussed prednisone taper  Discussed home care  Reportable signs and symptoms discussed  RTC if symptoms persist or fail to improve          See Patient Instructions  No follow-ups on file.    Betty Morse MD  [facility]      Video-Visit Details    Type of service:  Video Visit    Video End Time:8:33 AM    Originating Location (pt. Location): Home    Distant Location (provider location):  [facility]     Platform used for Video Visit: Ron    No follow-ups on file.       Betty Morse MD

## 2020-08-25 DIAGNOSIS — I10 HYPERTENSION GOAL BP (BLOOD PRESSURE) < 140/90: ICD-10-CM

## 2020-08-27 NOTE — TELEPHONE ENCOUNTER
Pending Prescriptions:                       Disp   Refills    lisinopril-hydrochlorothiazide (ZESTORETIC*60 tab*0        Sig: TAKE ONE TABLET BY MOUTH ONE TIME DAILY     Routing refill request to provider for review/approval because:  Labs not current:

## 2020-08-28 RX ORDER — LISINOPRIL/HYDROCHLOROTHIAZIDE 10-12.5 MG
TABLET ORAL
Qty: 30 TABLET | Refills: 0 | Status: SHIPPED | OUTPATIENT
Start: 2020-08-28 | End: 2020-10-12

## 2020-09-26 NOTE — PROGRESS NOTES
Subjective     Ingrid Vickers is a 55 year old female who presents to clinic today for the following health issues:    History of Present Illness        Hypertension: She presents for follow up of hypertension.  She does check blood pressure  regularly outside of the clinic. Outpatient blood pressures have not been over 140/90. She does not follow a low salt diet.     She eats 4 or more servings of fruits and vegetables daily.She consumes 0 sweetened beverage(s) daily.She exercises with enough effort to increase her heart rate 30 to 60 minutes per day.  She exercises with enough effort to increase her heart rate 6 days per week. She is missing 7 dose(s) of medications per week.         Hypertension Follow-up      Do you check your blood pressure regularly outside of the clinic? Yes     Are you following a low salt diet? No    Are your blood pressures ever more than 140 on the top number (systolic) OR more   than 90 on the bottom number (diastolic), for example 140/90? No      How many servings of fruits and vegetables do you eat daily?  4 or more    On average, how many sweetened beverages do you drink each day (Examples: soda, juice, sweet tea, etc.  Do NOT count diet or artificially sweetened beverages)?   0    How many days per week do you exercise enough to make your heart beat faster? 6    How many minutes a day do you exercise enough to make your heart beat faster? 30 - 60    How many days per week do you miss taking your medication? 0    Weight gain, she does not follow her numbers and only goes by how her clothes fit.  She states she is gained an inch and a half in her stomach and that her clothes are feeling tighter.  This is been over the last couple of months.      She states her home blood pressures are running 100s over 70s to 80s.  Today's blood pressure really surprised her.  She states she had a milder headache earlier in the day but that is resolved.  She denies chest pain, palpitations or  shortness of breath.    Review of Systems   CONSTITUTIONAL: NEGATIVE for fever, chills  RESP: NEGATIVE for significant cough or  shortness of breath  CV: NEGATIVE for chest pain, palpitations or peripheral edema      Objective    BP (!) 156/104   Pulse 88   Temp 97.4  F (36.3  C) (Temporal)   Wt 65.8 kg (145 lb)   SpO2 99%   Breastfeeding Unknown   BMI 26.25 kg/m    Body mass index is 26.25 kg/m .  Physical Exam   Gen: no apparent distress  NECK: no adenopathy, no asymmetry, no masses  Chest: clear to auscultation without wheeze, rale or rhonchi  Cor: regular rate and rhythm without murmur  ABDOMEN: soft, nontender, no masses and bowel sounds normal  Ext: warm and dry without edema  Psych: Alert and oriented times 3; coherent speech, normal   rate and volume, able to articulate logical thoughts, able   to abstract reason, no tangential thoughts, no hallucinations   or delusions  Her affect is neutral        Assessment & Plan     Hypertension goal BP (blood pressure) < 140/90  Poorly controlled today although patient is asymptomatic.  This is quite unusual to her home readings.  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  She is going to do that over the next few days and send me readings by the end of the week.  If her readings continue to stay elevated will increase her medication.  She states she has enough medication for about a week and so we will not send a refill until we are able to look at a few more blood pressures.  Did discuss that if she has severe headache, chest pain, shortness of breath she should go to the emergency department.    - Basic metabolic panel  (Ca, Cl, CO2, Creat, Gluc, K, Na, BUN)  - CBC with platelets  - *UA reflex to Microscopic and Culture ([facility] (except [place] and [place])    CKD (chronic kidney disease) stage 3, GFR 30-59 ml/min  Discussed that blood pressure control would be helpful.  Will check  labs.    Visit for screening mammogram  She is due for screening mammogram.  She states she will call and schedule this at her own convenience.    - MA SCREENING DIGITAL BILAT - Future  (s+30); Future     Lin Lanza MD  [facility]

## 2020-09-29 ENCOUNTER — OFFICE VISIT (OUTPATIENT)
Dept: FAMILY MEDICINE | Facility: OTHER | Age: 55
End: 2020-09-29
Payer: COMMERCIAL

## 2020-09-29 VITALS
HEART RATE: 88 BPM | OXYGEN SATURATION: 99 % | SYSTOLIC BLOOD PRESSURE: 156 MMHG | BODY MASS INDEX: 26.25 KG/M2 | TEMPERATURE: 97.4 F | WEIGHT: 145 LBS | DIASTOLIC BLOOD PRESSURE: 104 MMHG

## 2020-09-29 DIAGNOSIS — N18.30 CKD (CHRONIC KIDNEY DISEASE) STAGE 3, GFR 30-59 ML/MIN (H): ICD-10-CM

## 2020-09-29 DIAGNOSIS — Z12.31 VISIT FOR SCREENING MAMMOGRAM: ICD-10-CM

## 2020-09-29 DIAGNOSIS — I10 HYPERTENSION GOAL BP (BLOOD PRESSURE) < 140/90: Primary | ICD-10-CM

## 2020-09-29 LAB
ALBUMIN UR-MCNC: NEGATIVE MG/DL
ANION GAP SERPL CALCULATED.3IONS-SCNC: 5 MMOL/L (ref 3–14)
APPEARANCE UR: ABNORMAL
BACTERIA #/AREA URNS HPF: ABNORMAL /HPF
BILIRUB UR QL STRIP: NEGATIVE
BUN SERPL-MCNC: 21 MG/DL (ref 7–30)
CALCIUM SERPL-MCNC: 8.5 MG/DL (ref 8.5–10.1)
CHLORIDE SERPL-SCNC: 107 MMOL/L (ref 94–109)
CO2 SERPL-SCNC: 29 MMOL/L (ref 20–32)
COLOR UR AUTO: YELLOW
CREAT SERPL-MCNC: 1.04 MG/DL (ref 0.52–1.04)
ERYTHROCYTE [DISTWIDTH] IN BLOOD BY AUTOMATED COUNT: 11.9 % (ref 10–15)
GFR SERPL CREATININE-BSD FRML MDRD: 61 ML/MIN/{1.73_M2}
GLUCOSE SERPL-MCNC: 89 MG/DL (ref 70–99)
GLUCOSE UR STRIP-MCNC: NEGATIVE MG/DL
HCT VFR BLD AUTO: 38.6 % (ref 35–47)
HGB BLD-MCNC: 12.9 G/DL (ref 11.7–15.7)
HGB UR QL STRIP: ABNORMAL
KETONES UR STRIP-MCNC: NEGATIVE MG/DL
LEUKOCYTE ESTERASE UR QL STRIP: ABNORMAL
MCH RBC QN AUTO: 33.4 PG (ref 26.5–33)
MCHC RBC AUTO-ENTMCNC: 33.4 G/DL (ref 31.5–36.5)
MCV RBC AUTO: 100 FL (ref 78–100)
NITRATE UR QL: NEGATIVE
NON-SQ EPI CELLS #/AREA URNS LPF: ABNORMAL /LPF
PH UR STRIP: 6 PH (ref 5–7)
PLATELET # BLD AUTO: 223 10E9/L (ref 150–450)
POTASSIUM SERPL-SCNC: 3.5 MMOL/L (ref 3.4–5.3)
RBC # BLD AUTO: 3.86 10E12/L (ref 3.8–5.2)
RBC #/AREA URNS AUTO: ABNORMAL /HPF
SODIUM SERPL-SCNC: 141 MMOL/L (ref 133–144)
SOURCE: ABNORMAL
SP GR UR STRIP: >1.03 (ref 1–1.03)
UROBILINOGEN UR STRIP-ACNC: 0.2 EU/DL (ref 0.2–1)
WBC # BLD AUTO: 8.1 10E9/L (ref 4–11)
WBC #/AREA URNS AUTO: ABNORMAL /HPF

## 2020-09-29 PROCEDURE — 87086 URINE CULTURE/COLONY COUNT: CPT | Performed by: FAMILY MEDICINE

## 2020-09-29 PROCEDURE — 85027 COMPLETE CBC AUTOMATED: CPT | Performed by: FAMILY MEDICINE

## 2020-09-29 PROCEDURE — 81001 URINALYSIS AUTO W/SCOPE: CPT | Performed by: FAMILY MEDICINE

## 2020-09-29 PROCEDURE — 36415 COLL VENOUS BLD VENIPUNCTURE: CPT | Performed by: FAMILY MEDICINE

## 2020-09-29 PROCEDURE — 80048 BASIC METABOLIC PNL TOTAL CA: CPT | Performed by: FAMILY MEDICINE

## 2020-09-29 PROCEDURE — 99214 OFFICE O/P EST MOD 30 MIN: CPT | Performed by: FAMILY MEDICINE

## 2020-09-30 ENCOUNTER — MYC MEDICAL ADVICE (OUTPATIENT)
Dept: FAMILY MEDICINE | Facility: OTHER | Age: 55
End: 2020-09-30

## 2020-09-30 LAB
BACTERIA SPEC CULT: NO GROWTH
Lab: NORMAL
SPECIMEN SOURCE: NORMAL

## 2020-10-01 ENCOUNTER — MYC MEDICAL ADVICE (OUTPATIENT)
Dept: FAMILY MEDICINE | Facility: OTHER | Age: 55
End: 2020-10-01

## 2020-10-12 DIAGNOSIS — I10 HYPERTENSION GOAL BP (BLOOD PRESSURE) < 140/90: ICD-10-CM

## 2020-10-12 RX ORDER — LISINOPRIL/HYDROCHLOROTHIAZIDE 10-12.5 MG
1 TABLET ORAL DAILY
Qty: 30 TABLET | Refills: 0 | Status: SHIPPED | OUTPATIENT
Start: 2020-10-12 | End: 2020-12-04

## 2020-10-12 NOTE — TELEPHONE ENCOUNTER
Pending Prescriptions:                       Disp   Refills    lisinopril-hydrochlorothiazide (ZESTORETIC*30 tab*0        Sig: Take 1 tablet by mouth daily    Routing refill request to provider for review/approval because:  Labs out of range:    BP Readings from Last 3 Encounters:   09/29/20 (!) 156/104   06/02/20 108/64   02/13/20 (!) 142/92

## 2020-10-12 NOTE — TELEPHONE ENCOUNTER
TC sent Sevencet message, do not see response from patient.  She wants to know what BPs have been.  She may need to adjust the dose of this medication, recommend scheduled follow-up with TC.     Fam Johnson PA-C

## 2020-10-12 NOTE — LETTER
[facility]  [address]  [place] [address] [phone]        October 16, 2020    Ingrid Vickers  [address]  [place] [address]              Dear Ingrid Vickers    We have attempted to reach you by phone:   Lin SAHU Wanted to know some of your BP's in case your dose needs to be adjusted.   Your Lisinopril was refilled,  It is recommended that you schedule a follow up with Dr. High.      Sincerely,      JOSE/  - [facility]

## 2020-10-15 NOTE — TELEPHONE ENCOUNTER
LM for patient to return phone call to clinic about message below.  Maddison Dorsey CMA ([facility])

## 2020-12-03 ENCOUNTER — MYC MEDICAL ADVICE (OUTPATIENT)
Dept: FAMILY MEDICINE | Facility: OTHER | Age: 55
End: 2020-12-03

## 2020-12-03 DIAGNOSIS — I10 HYPERTENSION GOAL BP (BLOOD PRESSURE) < 140/90: ICD-10-CM

## 2020-12-04 RX ORDER — LISINOPRIL/HYDROCHLOROTHIAZIDE 10-12.5 MG
1 TABLET ORAL DAILY
Qty: 30 TABLET | Refills: 1 | Status: SHIPPED | OUTPATIENT
Start: 2020-12-04 | End: 2021-02-02

## 2020-12-04 NOTE — TELEPHONE ENCOUNTER
Pending Prescriptions:                       Disp   Refills    lisinopril-hydrochlorothiazide (ZESTORETIC*30 tab*0        Sig: Take 1 tablet by mouth daily    Last Written Prescription Date:  10/12/20  Last Fill Quantity: 30,  # refills: 0   Last office visit: 9/29/2020 with prescribing provider:  Pool   Future Office Visit:      Routing refill request to provider for review/approval because:  Labs out of range:  BP    Radha Kearns RN on 12/4/2020 at 8:44 AM

## 2021-01-09 ENCOUNTER — HEALTH MAINTENANCE LETTER (OUTPATIENT)
Age: 56
End: 2021-01-09

## 2021-02-01 DIAGNOSIS — I10 HYPERTENSION GOAL BP (BLOOD PRESSURE) < 140/90: ICD-10-CM

## 2021-02-02 RX ORDER — LISINOPRIL/HYDROCHLOROTHIAZIDE 10-12.5 MG
TABLET ORAL
Qty: 90 TABLET | Refills: 1 | Status: SHIPPED | OUTPATIENT
Start: 2021-02-02 | End: 2021-08-03

## 2021-02-02 NOTE — TELEPHONE ENCOUNTER
In December the patient sent over her home blood pressure readings which were in normal range.  Refills given.

## 2021-02-02 NOTE — TELEPHONE ENCOUNTER
Pending Prescriptions:                       Disp   Refills    lisinopril-hydrochlorothiazide (ZESTORETIC*30 tab*0        Sig: TAKE ONE TABLET BY MOUTH ONE TIME DAILY     Routing refill request to provider for review/approval because:  Labs out of range:    BP Readings from Last 3 Encounters:   09/29/20 (!) 156/104   06/02/20 108/64   02/13/20 (!) 142/92

## 2021-08-02 NOTE — PROGRESS NOTES
SUBJECTIVE:   CC: Ingrid Vickers is an 55 year old woman who presents for preventive health visit.     Patient has been advised of split billing requirements and indicates understanding: Yes  Healthy Habits:     Getting at least 3 servings of Calcium per day:  Yes    Bi-annual eye exam:  Yes    Dental care twice a year:  Yes    Sleep apnea or symptoms of sleep apnea:  None    Diet:  Regular (no restrictions)    Frequency of exercise:  6-7 days/week    Duration of exercise:  30-45 minutes    Taking medications regularly:  No    Barriers to taking medications:  Not applicable    Medication side effects:  None    PHQ-2 Total Score: 0    Additional concerns today:  No      Today's PHQ-2 Score:   PHQ-2 ( 1999 [employer]) 8/3/2021   Q1: Little interest or pleasure in doing things 0   Q2: Feeling down, depressed or hopeless 0   PHQ-2 Score 0   Q1: Little interest or pleasure in doing things Not at all   Q2: Feeling down, depressed or hopeless Not at all   PHQ-2 Score 0       Abuse: Current or Past (Physical, Sexual or Emotional) - No  Do you feel safe in your environment? Yes    Have you ever done Advance Care Planning? (For example, a Health Directive, POLST, or a discussion with a medical provider or your loved ones about your wishes): Yes, advance care planning is on file.    Social History     Tobacco Use     Smoking status: Never Smoker     Smokeless tobacco: Never Used     Tobacco comment: no smokers in household   Substance Use Topics     Alcohol use: Yes     Comment: few times a week         Alcohol Use 8/3/2021   Prescreen: >3 drinks/day or >7 drinks/week? No   Prescreen: >3 drinks/day or >7 drinks/week? -       Reviewed orders with patient.  Reviewed health maintenance and updated orders accordingly - Yes      Breast Cancer Screening:    Mammogram Screening: Recommended annual mammography  Pertinent mammograms are reviewed under the imaging tab.    History of abnormal Pap smear: YES - updated in Problem List  "and Health Maintenance accordingly  PAP / HPV Latest Ref Rng & Units 4/11/2016 11/26/2012 8/20/2008   PAP (Historical) - NIL NIL NIL   HPV16 NEG Negative - -   HPV18 NEG Negative - -   HRHPV NEG Negative - -     Reviewed and updated as needed this visit by clinical staff  Tobacco  Allergies  Meds  Problems  Med Hx  Surg Hx  Fam Hx  Soc Hx          Reviewed and updated as needed this visit by Provider  Tobacco  Allergies  Meds  Problems  Med Hx  Surg Hx  Fam Hx             Review of Systems   Constitutional: Negative for fever.   HENT: Negative for congestion, ear pain, hearing loss and sore throat.    Eyes: Negative for pain and visual disturbance.   Respiratory: Negative for cough and shortness of breath.    Cardiovascular: Negative for chest pain and peripheral edema.   Gastrointestinal: Positive for nausea. Negative for abdominal pain, constipation, diarrhea, heartburn and hematochezia.   Breasts:  Negative for tenderness and discharge.   Genitourinary: Positive for urgency. Negative for dysuria, frequency, genital sores, hematuria, pelvic pain, vaginal bleeding and vaginal discharge.   Musculoskeletal: Positive for arthralgias. Negative for joint swelling and myalgias.   Skin: Negative for rash.   Neurological: Negative for dizziness, weakness, headaches and paresthesias.   Psychiatric/Behavioral: Positive for mood changes. The patient is not nervous/anxious.           OBJECTIVE:   /82   Pulse 95   Temp 97.8  F (36.6  C) (Temporal)   Ht 1.568 m (5' 1.73\")   Wt 63 kg (139 lb)   SpO2 97%   Breastfeeding No   BMI 25.64 kg/m    Physical Exam  Constitutional:       General: She is not in acute distress.     Appearance: She is well-developed.   HENT:      Right Ear: Tympanic membrane and external ear normal.      Left Ear: Tympanic membrane and external ear normal.      Nose: Nose normal.      Mouth/Throat:      Pharynx: No oropharyngeal exudate.   Eyes:      General:         Right eye: No " discharge.         Left eye: No discharge.      Conjunctiva/sclera: Conjunctivae normal.      Pupils: Pupils are equal, round, and reactive to light.   Neck:      Thyroid: No thyromegaly.      Trachea: No tracheal deviation.   Cardiovascular:      Rate and Rhythm: Normal rate and regular rhythm.      Pulses: Normal pulses.      Heart sounds: Normal heart sounds, S1 normal and S2 normal. No murmur heard.   No friction rub. No S3 or S4 sounds.    Pulmonary:      Effort: Pulmonary effort is normal. No respiratory distress.      Breath sounds: Normal breath sounds. No wheezing or rales.   Chest:      Breasts:         Right: No mass, nipple discharge or tenderness.         Left: No mass, nipple discharge or tenderness.   Abdominal:      General: Bowel sounds are normal.      Palpations: Abdomen is soft. There is no mass.      Tenderness: There is no abdominal tenderness.   Genitourinary:     Cervix: No cervical motion tenderness or discharge.   Musculoskeletal:         General: Normal range of motion.      Cervical back: Neck supple.   Lymphadenopathy:      Cervical: No cervical adenopathy.   Skin:     General: Skin is warm and dry.      Findings: No rash.   Neurological:      Mental Status: She is alert and oriented to person, place, and time.      Motor: No abnormal muscle tone.      Deep Tendon Reflexes: Reflexes are normal and symmetric.   Psychiatric:         Thought Content: Thought content normal.         Judgment: Judgment normal.         ASSESSMENT/PLAN:   1. Routine general medical examination at a health care facility    2. Hypertension goal BP (blood pressure) < 140/90  Well-controlled.  Continue medication without change.  Labs are drawn.    - lisinopril-hydrochlorothiazide (ZESTORETIC) 10-12.5 MG tablet; Take 1 tablet by mouth daily  Dispense: 90 tablet; Refill: 3  - Lipid panel reflex to direct LDL Fasting  - Basic metabolic panel  (Ca, Cl, CO2, Creat, Gluc, K, Na, BUN)    3. Screening for malignant  "neoplasm of cervix    - Pap Screen with HPV - recommended age 30 - 65 years    4. Overweight (BMI 25.0-29.9)  Patient plans to start intermittent fasting.  She also feels she has reached a plateau in her weight loss.  She would like to try phentermine.  She has been on it in the past about 6 years ago.  Side effects were discussed.  Prescriptions given.  If she wants refill she will need to return for recheck of her blood pressure and weight.    - phentermine (ADIPEX-P) 37.5 MG tablet; Take 1 tablet (37.5 mg) by mouth every morning (before breakfast)  Dispense: 30 tablet; Refill: 0    Patient has been advised of split billing requirements and indicates understanding: Yes  COUNSELING:  Reviewed preventive health counseling, as reflected in patient instructions    Estimated body mass index is 25.64 kg/m  as calculated from the following:    Height as of this encounter: 1.568 m (5' 1.73\").    Weight as of this encounter: 63 kg (139 lb).    Weight management plan: Discussed healthy diet and exercise guidelines Started phentermine    She reports that she has never smoked. She has never used smokeless tobacco.      Counseling Resources:  ATP IV Guidelines  Pooled Cohorts Equation Calculator  Breast Cancer Risk Calculator  BRCA-Related Cancer Risk Assessment: FHS-7 Tool  FRAX Risk Assessment  ICSI Preventive Guidelines  Dietary Guidelines for Americans, 2010  USDA's MyPlate  ASA Prophylaxis  Lung CA Screening    Lin Lanza MD  [facility]"

## 2021-08-03 ENCOUNTER — OFFICE VISIT (OUTPATIENT)
Dept: FAMILY MEDICINE | Facility: OTHER | Age: 56
End: 2021-08-03
Payer: COMMERCIAL

## 2021-08-03 VITALS
DIASTOLIC BLOOD PRESSURE: 82 MMHG | SYSTOLIC BLOOD PRESSURE: 118 MMHG | BODY MASS INDEX: 25.58 KG/M2 | TEMPERATURE: 97.8 F | HEIGHT: 62 IN | WEIGHT: 139 LBS | OXYGEN SATURATION: 97 % | HEART RATE: 95 BPM

## 2021-08-03 DIAGNOSIS — Z00.00 ROUTINE GENERAL MEDICAL EXAMINATION AT A HEALTH CARE FACILITY: Primary | ICD-10-CM

## 2021-08-03 DIAGNOSIS — E66.3 OVERWEIGHT (BMI 25.0-29.9): ICD-10-CM

## 2021-08-03 DIAGNOSIS — I10 HYPERTENSION GOAL BP (BLOOD PRESSURE) < 140/90: ICD-10-CM

## 2021-08-03 DIAGNOSIS — Z12.4 SCREENING FOR MALIGNANT NEOPLASM OF CERVIX: ICD-10-CM

## 2021-08-03 PROBLEM — N18.30 CKD (CHRONIC KIDNEY DISEASE) STAGE 3, GFR 30-59 ML/MIN (H): Status: RESOLVED | Noted: 2019-08-20 | Resolved: 2021-08-03

## 2021-08-03 LAB
ANION GAP SERPL CALCULATED.3IONS-SCNC: 4 MMOL/L (ref 3–14)
BUN SERPL-MCNC: 11 MG/DL (ref 7–30)
CALCIUM SERPL-MCNC: 9 MG/DL (ref 8.5–10.1)
CHLORIDE BLD-SCNC: 108 MMOL/L (ref 94–109)
CHOLEST SERPL-MCNC: 237 MG/DL
CO2 SERPL-SCNC: 30 MMOL/L (ref 20–32)
CREAT SERPL-MCNC: 1.03 MG/DL (ref 0.52–1.04)
FASTING STATUS PATIENT QL REPORTED: YES
GFR SERPL CREATININE-BSD FRML MDRD: 62 ML/MIN/1.73M2
GLUCOSE BLD-MCNC: 88 MG/DL (ref 70–99)
HDLC SERPL-MCNC: 78 MG/DL
LDLC SERPL CALC-MCNC: 119 MG/DL
NONHDLC SERPL-MCNC: 159 MG/DL
POTASSIUM BLD-SCNC: 3.7 MMOL/L (ref 3.4–5.3)
SODIUM SERPL-SCNC: 142 MMOL/L (ref 133–144)
TRIGL SERPL-MCNC: 199 MG/DL

## 2021-08-03 PROCEDURE — 80048 BASIC METABOLIC PNL TOTAL CA: CPT | Performed by: FAMILY MEDICINE

## 2021-08-03 PROCEDURE — 80061 LIPID PANEL: CPT | Performed by: FAMILY MEDICINE

## 2021-08-03 PROCEDURE — 87624 HPV HI-RISK TYP POOLED RSLT: CPT | Performed by: FAMILY MEDICINE

## 2021-08-03 PROCEDURE — 99396 PREV VISIT EST AGE 40-64: CPT | Performed by: FAMILY MEDICINE

## 2021-08-03 PROCEDURE — G0145 SCR C/V CYTO,THINLAYER,RESCR: HCPCS | Performed by: FAMILY MEDICINE

## 2021-08-03 PROCEDURE — 99213 OFFICE O/P EST LOW 20 MIN: CPT | Mod: 25 | Performed by: FAMILY MEDICINE

## 2021-08-03 PROCEDURE — 36415 COLL VENOUS BLD VENIPUNCTURE: CPT | Performed by: FAMILY MEDICINE

## 2021-08-03 RX ORDER — LISINOPRIL/HYDROCHLOROTHIAZIDE 10-12.5 MG
1 TABLET ORAL DAILY
Qty: 90 TABLET | Refills: 3 | Status: SHIPPED | OUTPATIENT
Start: 2021-08-03 | End: 2022-09-15

## 2021-08-03 RX ORDER — PHENTERMINE HYDROCHLORIDE 37.5 MG/1
37.5 TABLET ORAL
Qty: 30 TABLET | Refills: 0 | Status: SHIPPED | OUTPATIENT
Start: 2021-08-03 | End: 2023-01-05

## 2021-08-03 ASSESSMENT — ENCOUNTER SYMPTOMS
HEADACHES: 0
MYALGIAS: 0
HEMATURIA: 0
CONSTIPATION: 0
WEAKNESS: 0
DIARRHEA: 0
HEMATOCHEZIA: 0
PARESTHESIAS: 0
JOINT SWELLING: 0
ABDOMINAL PAIN: 0
DIZZINESS: 0
ARTHRALGIAS: 1
COUGH: 0
FEVER: 0
EYE PAIN: 0
SORE THROAT: 0
HEARTBURN: 0
SHORTNESS OF BREATH: 0
DYSURIA: 0
NERVOUS/ANXIOUS: 0
NAUSEA: 1
FREQUENCY: 0

## 2021-08-03 ASSESSMENT — PAIN SCALES - GENERAL: PAINLEVEL: NO PAIN (0)

## 2021-08-03 ASSESSMENT — MIFFLIN-ST. JEOR: SCORE: 1184.5

## 2021-08-06 LAB
BKR LAB AP GYN ADEQUACY: NORMAL
BKR LAB AP GYN INTERPRETATION: NORMAL
BKR LAB AP HPV REFLEX: NORMAL
BKR LAB AP PREVIOUS ABNORMAL: NORMAL
PATH REPORT.COMMENTS IMP SPEC: NORMAL
PATH REPORT.RELEVANT HX SPEC: NORMAL

## 2021-08-09 LAB
HUMAN PAPILLOMA VIRUS 16 DNA: NEGATIVE
HUMAN PAPILLOMA VIRUS 18 DNA: NEGATIVE
HUMAN PAPILLOMA VIRUS FINAL DIAGNOSIS: NORMAL
HUMAN PAPILLOMA VIRUS OTHER HR: NEGATIVE

## 2021-08-10 ENCOUNTER — TELEPHONE (OUTPATIENT)
Dept: FAMILY MEDICINE | Facility: OTHER | Age: 56
End: 2021-08-10

## 2021-08-10 NOTE — TELEPHONE ENCOUNTER
Patient Quality Outreach Summary      Summary:    Patient is due/failing the following:   Breast Cancer Screening - Mammogram    Type of outreach:    Sent PiniOn message.    Questions for provider review:    None                                                                                                                    Shawnee Haywood CMA       Chart routed to Care Team.

## 2021-10-23 ENCOUNTER — HEALTH MAINTENANCE LETTER (OUTPATIENT)
Age: 56
End: 2021-10-23

## 2021-10-26 ENCOUNTER — TELEPHONE (OUTPATIENT)
Dept: FAMILY MEDICINE | Facility: OTHER | Age: 56
End: 2021-10-26
Payer: COMMERCIAL

## 2021-10-26 NOTE — TELEPHONE ENCOUNTER
Patient Quality Outreach Summary      Summary:    Patient is due/failing the following:   Breast Cancer Screening - Mammogram    Type of outreach:    Phone, left message for patient/parent to call back.    Questions for provider review:    None                                                                                                                    Shawnee Haywood CMA     Chart routed to Care Team.

## 2022-01-04 ENCOUNTER — NURSE TRIAGE (OUTPATIENT)
Dept: FAMILY MEDICINE | Facility: OTHER | Age: 57
End: 2022-01-04
Payer: COMMERCIAL

## 2022-01-04 NOTE — TELEPHONE ENCOUNTER
Spoke with patient.  Sick since last Tuesday.  Covid neg home test.  Backache.  Worse with deep breath on lower right side.  No injury, extra movement or twisting that she can think ok. Not sore touch.  Can twist and no issues, just with deep breath. Improved cough, not slight. No fever. No heart palpitations.  No SOB, except for pain with deep breathing.  Has tried tylenol, ibuprofen and heat.  unsure  it they helped at she was sick. Its better when she sits in a hard chair.    Will try some home cares and return call if not improving for an appointment.    Reason for Disposition    Back pain    Additional Information    Negative: Back pain lasts > 2 weeks    Negative: Back pain is a chronic symptom (recurrent or ongoing AND lasting > 4 weeks)    Negative: MODERATE back pain (e.g., interferes with normal activities) and present > 3 days    Negative: Pain radiates into the thigh or further down the leg    Negative: Patient wants to be seen    Negative: Age > 50 and no history of prior similar back pain    Negative: SEVERE back pain (e.g., excruciating, unable to do any normal activities) and not improved after pain medicine and CARE ADVICE    Negative: Numbness in an arm or hand (i.e., loss of sensation) and upper back pain    Negative: Numbness in a leg or foot (i.e., loss of sensation)    Negative: High-risk adult (e.g., history of cancer, history of HIV, or history of IV drug abuse)    Negative: Painful rash with multiple small blisters grouped together (i.e., dermatomal distribution or 'band' or 'stripe')    Negative: Pain radiates into the thigh or further down the leg, and in both legs    Negative: Fever > 100.4 F (38.0 C) and flank pain    Negative: Pain or burning with passing urine (urination)    Negative: Pain radiates into groin, scrotum    Negative: Blood in urine (red, pink, or tea-colored)    Negative: Vomiting and pain over lower ribs of back (i.e., flank - kidney area)    Negative: Weakness of a leg  or foot (e.g., unable to bear weight, dragging foot)    Negative: Patient sounds very sick or weak to the triager    Negative: SEVERE back pain of sudden onset and age > 60    Negative: SEVERE abdominal pain (e.g., excruciating)    Negative: Abdominal pain and age > 60    Negative: Unable to urinate (or only a few drops) and bladder feels very full    Negative: Loss of bladder or bowel control (urine or bowel incontinence; wetting self, leaking stool) of new onset    Negative: Numbness (loss of sensation) in groin or rectal area    Negative: Major injury to the back (e.g., MVA, fall > 10 feet or 3 meters, penetrating injury, etc.)    Negative: Pain in the upper back over the ribs (rib cage) that radiates (travels) into the chest    Negative: Pain in the upper back over the ribs (rib cage) and worsened by coughing (or clearly increases with breathing)    Negative: Passed out (i.e., fainted, collapsed and was not responding)    Negative: Shock suspected (e.g., cold/pale/clammy skin, too weak to stand, low BP, rapid pulse)    Negative: Sounds like a life-threatening emergency to the triager    Protocols used: BACK PAIN-A-OH

## 2022-02-12 ENCOUNTER — HEALTH MAINTENANCE LETTER (OUTPATIENT)
Age: 57
End: 2022-02-12

## 2022-09-14 DIAGNOSIS — I10 HYPERTENSION GOAL BP (BLOOD PRESSURE) < 140/90: ICD-10-CM

## 2022-09-15 RX ORDER — LISINOPRIL/HYDROCHLOROTHIAZIDE 10-12.5 MG
TABLET ORAL
Qty: 90 TABLET | Refills: 0 | Status: SHIPPED | OUTPATIENT
Start: 2022-09-15 | End: 2023-01-05

## 2022-09-15 NOTE — TELEPHONE ENCOUNTER
Pending Prescriptions:                       Disp   Refills    lisinopril-hydrochlorothiazide (ZESTORETI*90 tab*0            Sig: TAKE ONE TABLET BY MOUTH ONE TIME DAILY    Medication is being filled for 1 time maximiliano refill only due to:  Patient is due for annual    Please call and help schedule.  Thank you!

## 2022-10-09 ENCOUNTER — HEALTH MAINTENANCE LETTER (OUTPATIENT)
Age: 57
End: 2022-10-09

## 2023-01-05 ENCOUNTER — OFFICE VISIT (OUTPATIENT)
Dept: FAMILY MEDICINE | Facility: OTHER | Age: 58
End: 2023-01-05
Payer: COMMERCIAL

## 2023-01-05 VITALS
HEIGHT: 62 IN | TEMPERATURE: 97.8 F | BODY MASS INDEX: 24.75 KG/M2 | DIASTOLIC BLOOD PRESSURE: 78 MMHG | RESPIRATION RATE: 18 BRPM | WEIGHT: 134.5 LBS | OXYGEN SATURATION: 98 % | SYSTOLIC BLOOD PRESSURE: 124 MMHG | HEART RATE: 85 BPM

## 2023-01-05 DIAGNOSIS — I10 HYPERTENSION GOAL BP (BLOOD PRESSURE) < 140/90: Primary | ICD-10-CM

## 2023-01-05 DIAGNOSIS — Z12.31 ENCOUNTER FOR SCREENING MAMMOGRAM FOR BREAST CANCER: ICD-10-CM

## 2023-01-05 DIAGNOSIS — Z13.220 SCREENING FOR HYPERLIPIDEMIA: ICD-10-CM

## 2023-01-05 PROCEDURE — 99213 OFFICE O/P EST LOW 20 MIN: CPT | Performed by: PHYSICIAN ASSISTANT

## 2023-01-05 RX ORDER — LISINOPRIL/HYDROCHLOROTHIAZIDE 10-12.5 MG
1 TABLET ORAL DAILY
Qty: 90 TABLET | Refills: 3 | Status: SHIPPED | OUTPATIENT
Start: 2023-01-05 | End: 2024-02-26

## 2023-01-05 ASSESSMENT — PAIN SCALES - GENERAL: PAINLEVEL: NO PAIN (0)

## 2023-01-05 NOTE — PROGRESS NOTES
Assessment & Plan     ICD-10-CM    1. Hypertension goal BP (blood pressure) < 140/90  I10 BASIC METABOLIC PANEL     lisinopril-hydrochlorothiazide (ZESTORETIC) 10-12.5 MG tablet      2. Screening for hyperlipidemia  Z13.220 Lipid panel reflex to direct LDL Fasting      3. Encounter for screening mammogram for breast cancer  Z12.31 MA SCREENING DIGITAL BILAT - Future  (s+30)        - Patient reports things are going well with BP, no side effects  - Reviewed medication use and side effects   - Patient crunched for time today, so will schedule fasting labs another time, did advise need for mammogram       Review of the result(s) of each unique test - See list        8/3/21 - BMP, Lipid   Diagnosis or treatment significantly limited by social determinants of health - None     15 minutes spent on the date of the encounter doing chart review, history and exam, documentation and further activities as noted above    The patient indicates understanding of these issues and agrees with the plan.    Return in about 1 year (around 1/5/2024).    PA student as below was present and participated in shadow capacity only    Alexander Sparks, PAAdyS  [facility]     Alaina Dorantes PA-C  [facility] Cast[facility]ano is a 57 year old, presenting for the following health issues:  Hypertension       History of Present Illness       Hypertension: She presents for follow up of hypertension.  She does not check blood pressure  regularly outside of the clinic. Outpatient blood pressures have not been over 140/90. She does not follow a low salt diet.       Hypertension Follow-up      Do you check your blood pressure regularly outside of the clinic? No     Are you following a low salt diet? Yes    Are your blood pressures ever more than 140 on the top number (systolic) OR more   than 90 on the bottom number (diastolic), for example 140/90? No      Review of Systems   Constitutional,  "HEENT, cardiovascular, pulmonary, gi and gu systems are negative, except as otherwise noted.      Objective    /78 (Cuff Size: Adult Regular)   Pulse 85   Temp 97.8  F (36.6  C) (Temporal)   Resp 18   Ht 5' 2\" (1.575 m)   Wt 134 lb 8 oz (61 kg)   SpO2 98%   BMI 24.60 kg/m    Body mass index is 24.6 kg/m .  Physical Exam   GENERAL APPEARANCE: healthy, alert and no distress  EYES: Eyes grossly normal to inspection, PERRLA, conjunctivae and sclerae without injection or discharge, EOM intact   RESP: Lungs clear to auscultation - no rales, rhonchi or wheezes    CV: Regular rates and rhythm, normal S1 S2, no S3 or S4, no murmur, click or rub, no peripheral edema and peripheral pulses strong and symmetric bilaterally   MS: No musculoskeletal defects are noted and gait is age appropriate without ataxia   SKIN: No suspicious lesions or rashes, hydration status appears adeuqate with normal skin turgor   PSYCH: Alert and oriented x3; speech- coherent , normal rate and volume; able to articulate logical thoughts, able to abstract reason, no tangential thoughts, no hallucinations or delusions, mentation appears normal, Mood is euthymic. Affect is appropriate for this mood state and bright. Thought content is free of suicidal ideation, hallucinations, and delusions. Dress is adequate and upkept. Eye contact is good during conversation.       Diagnostics: Reviewed in Epic, See orders pending in [facility], patient to schedule           "

## 2023-04-25 ENCOUNTER — TELEPHONE (OUTPATIENT)
Dept: FAMILY MEDICINE | Facility: OTHER | Age: 58
End: 2023-04-25

## 2023-04-25 NOTE — TELEPHONE ENCOUNTER
Patient scheduled to be seen in clinic for dog bit and inability to move hand.   Called to triage as likely not appropriate for office visit, and appears patient has been seen in ED.     Called and spoke with patient. She confirms she went to ED and office visit no longer needed. Appointment cancelled.     Genna RASCONN, RN  [facility]

## 2023-06-27 ENCOUNTER — ALLIED HEALTH/NURSE VISIT (OUTPATIENT)
Dept: FAMILY MEDICINE | Facility: OTHER | Age: 58
End: 2023-06-27
Payer: COMMERCIAL

## 2023-06-27 VITALS — OXYGEN SATURATION: 100 % | HEART RATE: 85 BPM | DIASTOLIC BLOOD PRESSURE: 82 MMHG | SYSTOLIC BLOOD PRESSURE: 114 MMHG

## 2023-06-27 DIAGNOSIS — Z78.9 TRIAGE ASSESSMENT CLASS 3, NONURGENT: Primary | ICD-10-CM

## 2023-06-27 PROCEDURE — 99207 PR NO CHARGE NURSE ONLY: CPT

## 2023-06-27 NOTE — PROGRESS NOTES
Patient had excruciating chest pain last night. Radiated into her back-into the center and then outward. Rated at that time at 8/10. She called 911. By the time they arrived her pain was at a 3/10. They took her BP and it was 181/124. They did an EKG and they noted that she had a sinus rhythm. They stated that she did not need to go to the ED if she agreed to go in in the morning to have her BP checked.     She denies pain currently. She did take her BP medication this morning. Denies dizziness, lightheadedness, SOB, vision changes, or chest pain currently.     Huddled with PCP. Ok for patient to leave now. If symptoms return or worsen, go to ED. PCP would like to see patient on Friday for follow up. Scheduled     Appointments in Next Year    Jun 27, 2023  9:00 AM  Nurse Only with NL RN ERC  [facility] ([facility] ) [phone]   Jun 30, 2023 11:30 AM  (Arrive by 11:10 AM)  Provider Visit with Lin Lanza MD  [facility] ([facility] ) [phone]              Denisse Aparicio, ABIODUNN, RN, PHN  Registered Nurse-Clinic Triage  [facility]  6/27/2023 at 8:51 AM

## 2023-06-30 ENCOUNTER — OFFICE VISIT (OUTPATIENT)
Dept: FAMILY MEDICINE | Facility: OTHER | Age: 58
End: 2023-06-30
Payer: COMMERCIAL

## 2023-06-30 VITALS
BODY MASS INDEX: 24.91 KG/M2 | DIASTOLIC BLOOD PRESSURE: 58 MMHG | SYSTOLIC BLOOD PRESSURE: 118 MMHG | TEMPERATURE: 97 F | HEART RATE: 91 BPM | OXYGEN SATURATION: 97 % | HEIGHT: 62 IN

## 2023-06-30 DIAGNOSIS — I10 HYPERTENSION GOAL BP (BLOOD PRESSURE) < 140/90: ICD-10-CM

## 2023-06-30 DIAGNOSIS — R07.89 OTHER CHEST PAIN: Primary | ICD-10-CM

## 2023-06-30 LAB
ALBUMIN SERPL BCG-MCNC: 3.9 G/DL (ref 3.5–5.2)
ALP SERPL-CCNC: 55 U/L (ref 35–104)
ALT SERPL W P-5'-P-CCNC: 17 U/L (ref 0–50)
ANION GAP SERPL CALCULATED.3IONS-SCNC: 11 MMOL/L (ref 7–15)
AST SERPL W P-5'-P-CCNC: 30 U/L (ref 0–45)
BILIRUB SERPL-MCNC: 0.6 MG/DL
BUN SERPL-MCNC: 19.9 MG/DL (ref 6–20)
CALCIUM SERPL-MCNC: 8.9 MG/DL (ref 8.6–10)
CHLORIDE SERPL-SCNC: 105 MMOL/L (ref 98–107)
CHOLEST SERPL-MCNC: 222 MG/DL
CREAT SERPL-MCNC: 0.99 MG/DL (ref 0.51–0.95)
D DIMER PPP FEU-MCNC: 0.32 UG/ML FEU (ref 0–0.5)
DEPRECATED HCO3 PLAS-SCNC: 25 MMOL/L (ref 22–29)
ERYTHROCYTE [DISTWIDTH] IN BLOOD BY AUTOMATED COUNT: 12.4 % (ref 10–15)
GFR SERPL CREATININE-BSD FRML MDRD: 67 ML/MIN/1.73M2
GLUCOSE SERPL-MCNC: 97 MG/DL (ref 70–99)
HCT VFR BLD AUTO: 39.8 % (ref 35–47)
HDLC SERPL-MCNC: 67 MG/DL
HGB BLD-MCNC: 13.4 G/DL (ref 11.7–15.7)
LDLC SERPL CALC-MCNC: 130 MG/DL
MCH RBC QN AUTO: 32.6 PG (ref 26.5–33)
MCHC RBC AUTO-ENTMCNC: 33.7 G/DL (ref 31.5–36.5)
MCV RBC AUTO: 97 FL (ref 78–100)
NONHDLC SERPL-MCNC: 155 MG/DL
PLATELET # BLD AUTO: 218 10E3/UL (ref 150–450)
POTASSIUM SERPL-SCNC: 3.6 MMOL/L (ref 3.4–5.3)
PROT SERPL-MCNC: 6.4 G/DL (ref 6.4–8.3)
RBC # BLD AUTO: 4.11 10E6/UL (ref 3.8–5.2)
SODIUM SERPL-SCNC: 141 MMOL/L (ref 136–145)
TRIGL SERPL-MCNC: 126 MG/DL
TROPONIN T SERPL HS-MCNC: 6 NG/L
TSH SERPL DL<=0.005 MIU/L-ACNC: 1.18 UIU/ML (ref 0.3–4.2)
WBC # BLD AUTO: 5.6 10E3/UL (ref 4–11)

## 2023-06-30 PROCEDURE — 85027 COMPLETE CBC AUTOMATED: CPT | Performed by: FAMILY MEDICINE

## 2023-06-30 PROCEDURE — 85379 FIBRIN DEGRADATION QUANT: CPT | Performed by: FAMILY MEDICINE

## 2023-06-30 PROCEDURE — 99214 OFFICE O/P EST MOD 30 MIN: CPT | Performed by: FAMILY MEDICINE

## 2023-06-30 PROCEDURE — 36415 COLL VENOUS BLD VENIPUNCTURE: CPT | Performed by: FAMILY MEDICINE

## 2023-06-30 PROCEDURE — 80061 LIPID PANEL: CPT | Performed by: FAMILY MEDICINE

## 2023-06-30 PROCEDURE — 84443 ASSAY THYROID STIM HORMONE: CPT | Performed by: FAMILY MEDICINE

## 2023-06-30 PROCEDURE — 84484 ASSAY OF TROPONIN QUANT: CPT | Performed by: FAMILY MEDICINE

## 2023-06-30 PROCEDURE — 80053 COMPREHEN METABOLIC PANEL: CPT | Performed by: FAMILY MEDICINE

## 2023-06-30 ASSESSMENT — PAIN SCALES - GENERAL: PAINLEVEL: NO PAIN (0)

## 2023-06-30 NOTE — PROGRESS NOTES
Assessment & Plan     Other chest pain  Patient had an episode of sharp and severe chest pain that woke her up from sleep.  She did take 2 Tums.  By the time paramedics arrived her pain had resolved.  She estimates this was about an hour of having pain.  She has never had this before.  She has been feeling fine since then.  As she is not having any cough or shortness of breath will not do chest x-ray today.  Did take a copy of the EKG that the paramedics did and agree that this is a normal sinus rhythm.  Her blood pressure is back to normal today.  We discussed differential diagnosis which includes cardiac disease, Minh pulmonary, gastrointestinal.  Will obtain labs.  Discussed that if D-dimer is elevated would then need a chest CT.  We will also refer for stress test.  Consider probable GI and recommend that she continue with Tums as needed but at this point will not start proton pump inhibitor.    - Echocardiogram Exercise Stress; Future  - CBC with platelets  - Comprehensive metabolic panel (BMP + Alb, Alk Phos, ALT, AST, Total. Bili, TP)  - TSH with free T4 reflex  - D dimer, quantitative  - Troponin T, High Sensitivity    Hypertension goal BP (blood pressure) < 140/90  Blood pressure well controlled today.  Continue her Zestoretic without change.      Lin Lanza MD  [facility] is a 57 year [facility], presenting for the following health issues:  Follow Up        6/30/2023    11:25 AM   Additional Questions   Roomed by Martha CHOE   Accompanied by Self     History of Present Illness       Hypertension: She presents for follow up of hypertension.  She does check blood pressure  regularly outside of the clinic. Outside blood pressures have been over 140/90. She does not follow a low salt diet.     She eats 2-3 servings of fruits and vegetables daily.She consumes 0 sweetened beverage(s) daily.She exercises with enough effort to increase her heart rate 30 to 60  "minutes per day.  She exercises with enough effort to increase her heart rate 7 days per week. She is missing 1 dose(s) of medications per week.     3 nights ago patient was woken up at night for excruciating chest pain.  She states the pain was in the center of her chest and was like a knife going straight through her back.  She rated it about an 8 out of 10.  She called 911.  When paramedics arrived she states that chest pain had resolved.  They did an EKG and gave her a copy of it which was a normal sinus rhythm without any ST changes.  She denied having any shortness of breath or chest tightness.  She denied having any cold symptoms or cough.  She denied sweating.  She denies having any chest discomfort with any activity or exercise.  She did take a couple of Tums when she woke up but otherwise denies having prior history of heartburn or knowing what heartburn feels like.  She has been doing her normal activities since then.  Her blood pressure was noted to be elevated then and was elevated on recheck in the clinic the following day which then improved after rest.  She is taking her blood pressure medication without issue.  She is a non-smoker.  No family history of cardiac issues and primary degree relatives.        Objective    /58   Pulse 91   Temp 97  F (36.1  C) (Temporal)   Ht 1.565 m (5' 1.61\")   SpO2 97%   BMI 24.91 kg/m    Body mass index is 24.91 kg/m .  Physical Exam   Gen: no apparent distress  NECK: no adenopathy, no asymmetry, no masses  Chest: clear to auscultation without wheeze, rale or rhonchi  Cor: regular rate and rhythm without murmur, no chest wall tenderness  ABDOMEN: soft, nontender, no masses and bowel sounds normal  Ext: warm and dry without edema  Psych: Alert and oriented times 3; coherent speech, normal   rate and volume, able to articulate logical thoughts, able   to abstract reason, no tangential thoughts, no hallucinations   or delusions  Her affect is neutral    "

## 2023-08-21 ENCOUNTER — HOSPITAL ENCOUNTER (OUTPATIENT)
Dept: CARDIOLOGY | Facility: CLINIC | Age: 58
Discharge: HOME | End: 2023-08-21
Attending: FAMILY MEDICINE | Admitting: FAMILY MEDICINE
Payer: COMMERCIAL

## 2023-08-21 DIAGNOSIS — R07.89 OTHER CHEST PAIN: ICD-10-CM

## 2023-08-21 PROCEDURE — 93016 CV STRESS TEST SUPVJ ONLY: CPT | Performed by: INTERNAL MEDICINE

## 2023-08-21 PROCEDURE — 93017 CV STRESS TEST TRACING ONLY: CPT

## 2023-08-21 PROCEDURE — 93350 STRESS TTE ONLY: CPT | Mod: 26 | Performed by: INTERNAL MEDICINE

## 2023-08-21 PROCEDURE — 93018 CV STRESS TEST I&R ONLY: CPT | Performed by: INTERNAL MEDICINE

## 2023-08-21 PROCEDURE — 93325 DOPPLER ECHO COLOR FLOW MAPG: CPT | Mod: TC

## 2023-08-21 PROCEDURE — 93321 DOPPLER ECHO F-UP/LMTD STD: CPT | Mod: 26 | Performed by: INTERNAL MEDICINE

## 2023-08-21 PROCEDURE — 93325 DOPPLER ECHO COLOR FLOW MAPG: CPT | Mod: 26 | Performed by: INTERNAL MEDICINE

## 2023-10-28 ENCOUNTER — HEALTH MAINTENANCE LETTER (OUTPATIENT)
Age: 58
End: 2023-10-28

## 2024-01-19 NOTE — NURSING NOTE
"Chief Complaint   Patient presents with     Menopausal Sx     lack of sex drive, menopausal questions       Initial BP (!) 142/92 (BP Location: Left arm, Patient Position: Sitting, Cuff Size: Adult Regular)   Pulse 80   Wt 64.5 kg (142 lb 4 oz)   LMP 01/13/2020 (Approximate)   BMI 26.02 kg/m   Estimated body mass index is 26.02 kg/m  as calculated from the following:    Height as of 8/20/19: 1.575 m (5' 2\").    Weight as of this encounter: 64.5 kg (142 lb 4 oz).  Medication Reconciliation: complete    Minal Huang CMA    " abcess

## 2024-01-24 ENCOUNTER — NURSE TRIAGE (OUTPATIENT)
Dept: FAMILY MEDICINE | Facility: OTHER | Age: 59
End: 2024-01-24
Payer: COMMERCIAL

## 2024-01-24 NOTE — TELEPHONE ENCOUNTER
S-(situation): pt calling with vaginal bleeding issues     B-(background): pt is 3 years into menopause     A-(assessment): pt is no longer having vaginal bleeding this was a one time occurrence. Denies dizziness, not on blood thinners, no clotting or passing of tissue Pt is not having abdominal pain at this time but she does have on and off pain since August     R-(recommendations): be seen in 2 weeks RN discussed in detail when to be seen sooner.     Rn assisted in scheduling appointment      Patient verbalized understanding and in agreement with plan of care.     Moon Garay RN    Reason for Disposition   Age > 39 years with irregular or excessive bleeding    Additional Information   Negative: SEVERE vaginal bleeding (e.g., continuous red blood from vagina, or large blood clots) and very weak (can't stand)   Negative: Passed out (i.e., fainted, collapsed and was not responding)   Negative: Difficult to awaken or acting confused (e.g., disoriented, slurred speech)   Negative: Shock suspected (e.g., cold/pale/clammy skin, too weak to stand, low BP, rapid pulse)   Negative: Sounds like a life-threatening emergency to the triager   Negative: Pregnant 20 or more weeks (5 months or more)   Negative: Pregnant < 20 weeks (less than 5 months)   Negative: Postpartum (from 0 to 6 weeks after delivery)   Negative: Vaginal discharge is main symptom and bleeding is slight   Negative: SEVERE abdominal pain (e.g., excruciating)   Negative: SEVERE dizziness (e.g., unable to stand, requires support to walk, feels like passing out now)   Negative: SEVERE vaginal bleeding (e.g., soaking 2 pads or tampons per hour and present 2 or more hours; 1 menstrual cup every 2 hours)   Negative: Patient sounds very sick or weak to the triager   Negative: MODERATE vaginal bleeding (i.e., soaking pad or tampon per hour and present > 6 hours; 1 menstrual cup every 6 hours)   Negative: Constant abdominal pain lasting > 2 hours   Negative: Pale  skin (pallor) of new-onset or worsening   Negative: Taking Coumadin (warfarin) or other strong blood thinner, or known bleeding disorder (e.g., thrombocytopenia)   Negative: Skin bruises or nosebleed and not caused by an injury   Negative: Patient wants to be seen   Negative: Passed tissue (e.g., gray-white)   Negative: Bleeding or spotting after procedure (e.g., biopsy) or pelvic examination (e.g., pap smear) that lasts > 7 days    Protocols used: Vaginal Bleeding - [id number]

## 2024-01-27 ENCOUNTER — APPOINTMENT (OUTPATIENT)
Dept: ULTRASOUND IMAGING | Facility: CLINIC | Age: 59
End: 2024-01-27
Attending: FAMILY MEDICINE
Payer: COMMERCIAL

## 2024-01-27 ENCOUNTER — HOSPITAL ENCOUNTER (EMERGENCY)
Facility: CLINIC | Age: 59
Discharge: HOME | End: 2024-01-27
Attending: FAMILY MEDICINE | Admitting: FAMILY MEDICINE
Payer: COMMERCIAL

## 2024-01-27 VITALS
OXYGEN SATURATION: 100 % | DIASTOLIC BLOOD PRESSURE: 113 MMHG | RESPIRATION RATE: 18 BRPM | HEART RATE: 96 BPM | TEMPERATURE: 97.9 F | SYSTOLIC BLOOD PRESSURE: 142 MMHG

## 2024-01-27 DIAGNOSIS — R93.89 ENDOMETRIAL THICKENING ON ULTRASOUND: ICD-10-CM

## 2024-01-27 DIAGNOSIS — D25.9 UTERINE LEIOMYOMA, UNSPECIFIED LOCATION: ICD-10-CM

## 2024-01-27 DIAGNOSIS — N95.0 POST-MENOPAUSAL BLEEDING: ICD-10-CM

## 2024-01-27 LAB
ABO/RH(D): NORMAL
ALBUMIN SERPL BCG-MCNC: 4.1 G/DL (ref 3.5–5.2)
ALBUMIN UR-MCNC: NEGATIVE MG/DL
ALP SERPL-CCNC: 69 U/L (ref 40–150)
ALT SERPL W P-5'-P-CCNC: 13 U/L (ref 0–50)
ANION GAP SERPL CALCULATED.3IONS-SCNC: 14 MMOL/L (ref 7–15)
ANTIBODY SCREEN: NEGATIVE
APPEARANCE UR: CLEAR
APTT PPP: 34 SECONDS (ref 22–38)
AST SERPL W P-5'-P-CCNC: 20 U/L (ref 0–45)
BACTERIA #/AREA URNS HPF: ABNORMAL /HPF
BASOPHILS # BLD AUTO: 0.1 10E3/UL (ref 0–0.2)
BASOPHILS NFR BLD AUTO: 1 %
BILIRUB SERPL-MCNC: 0.3 MG/DL
BILIRUB UR QL STRIP: NEGATIVE
BUN SERPL-MCNC: 18.2 MG/DL (ref 6–20)
CALCIUM SERPL-MCNC: 8.7 MG/DL (ref 8.6–10)
CHLORIDE SERPL-SCNC: 100 MMOL/L (ref 98–107)
COLOR UR AUTO: YELLOW
CREAT SERPL-MCNC: 1.05 MG/DL (ref 0.51–0.95)
DEPRECATED HCO3 PLAS-SCNC: 22 MMOL/L (ref 22–29)
EGFRCR SERPLBLD CKD-EPI 2021: 62 ML/MIN/1.73M2
EOSINOPHIL # BLD AUTO: 0.3 10E3/UL (ref 0–0.7)
EOSINOPHIL NFR BLD AUTO: 4 %
ERYTHROCYTE [DISTWIDTH] IN BLOOD BY AUTOMATED COUNT: 12.4 % (ref 10–15)
GLUCOSE SERPL-MCNC: 93 MG/DL (ref 70–99)
GLUCOSE UR STRIP-MCNC: NEGATIVE MG/DL
HCT VFR BLD AUTO: 42.7 % (ref 35–47)
HGB BLD-MCNC: 14.9 G/DL (ref 11.7–15.7)
HGB UR QL STRIP: ABNORMAL
IMM GRANULOCYTES # BLD: 0 10E3/UL
IMM GRANULOCYTES NFR BLD: 0 %
INR PPP: 1.03 (ref 0.85–1.15)
KETONES UR STRIP-MCNC: NEGATIVE MG/DL
LEUKOCYTE ESTERASE UR QL STRIP: ABNORMAL
LYMPHOCYTES # BLD AUTO: 2.2 10E3/UL (ref 0.8–5.3)
LYMPHOCYTES NFR BLD AUTO: 36 %
MCH RBC QN AUTO: 32.3 PG (ref 26.5–33)
MCHC RBC AUTO-ENTMCNC: 34.9 G/DL (ref 31.5–36.5)
MCV RBC AUTO: 93 FL (ref 78–100)
MONOCYTES # BLD AUTO: 0.6 10E3/UL (ref 0–1.3)
MONOCYTES NFR BLD AUTO: 9 %
MUCOUS THREADS #/AREA URNS LPF: PRESENT /LPF
NEUTROPHILS # BLD AUTO: 3.1 10E3/UL (ref 1.6–8.3)
NEUTROPHILS NFR BLD AUTO: 50 %
NITRATE UR QL: NEGATIVE
NRBC # BLD AUTO: 0 10E3/UL
NRBC BLD AUTO-RTO: 0 /100
PH UR STRIP: 5 [PH] (ref 5–7)
PLATELET # BLD AUTO: 251 10E3/UL (ref 150–450)
POTASSIUM SERPL-SCNC: 4.2 MMOL/L (ref 3.4–5.3)
PROT SERPL-MCNC: 6.9 G/DL (ref 6.4–8.3)
RBC # BLD AUTO: 4.61 10E6/UL (ref 3.8–5.2)
RBC URINE: 4 /HPF
SODIUM SERPL-SCNC: 136 MMOL/L (ref 135–145)
SP GR UR STRIP: 1.01 (ref 1–1.03)
SPECIMEN EXPIRATION DATE: NORMAL
SQUAMOUS EPITHELIAL: 3 /HPF
TRANSITIONAL EPI: <1 /HPF
TSH SERPL DL<=0.005 MIU/L-ACNC: 3.2 UIU/ML (ref 0.3–4.2)
UROBILINOGEN UR STRIP-MCNC: NORMAL MG/DL
WBC # BLD AUTO: 6.3 10E3/UL (ref 4–11)
WBC CLUMPS #/AREA URNS HPF: PRESENT /HPF
WBC URINE: 25 /HPF

## 2024-01-27 PROCEDURE — 76830 TRANSVAGINAL US NON-OB: CPT

## 2024-01-27 PROCEDURE — 85025 COMPLETE CBC W/AUTO DIFF WBC: CPT | Performed by: FAMILY MEDICINE

## 2024-01-27 PROCEDURE — 80053 COMPREHEN METABOLIC PANEL: CPT | Performed by: FAMILY MEDICINE

## 2024-01-27 PROCEDURE — 85610 PROTHROMBIN TIME: CPT | Performed by: FAMILY MEDICINE

## 2024-01-27 PROCEDURE — 84443 ASSAY THYROID STIM HORMONE: CPT | Performed by: FAMILY MEDICINE

## 2024-01-27 PROCEDURE — 99284 EMERGENCY DEPT VISIT MOD MDM: CPT | Mod: 25

## 2024-01-27 PROCEDURE — 81001 URINALYSIS AUTO W/SCOPE: CPT | Performed by: FAMILY MEDICINE

## 2024-01-27 PROCEDURE — 36415 COLL VENOUS BLD VENIPUNCTURE: CPT | Performed by: FAMILY MEDICINE

## 2024-01-27 PROCEDURE — 85730 THROMBOPLASTIN TIME PARTIAL: CPT | Performed by: FAMILY MEDICINE

## 2024-01-27 PROCEDURE — 76856 US EXAM PELVIC COMPLETE: CPT

## 2024-01-27 PROCEDURE — 99284 EMERGENCY DEPT VISIT MOD MDM: CPT | Performed by: FAMILY MEDICINE

## 2024-01-27 PROCEDURE — 86900 BLOOD TYPING SEROLOGIC ABO: CPT | Performed by: FAMILY MEDICINE

## 2024-01-27 ASSESSMENT — ACTIVITIES OF DAILY LIVING (ADL): ADLS_ACUITY_SCORE: 35

## 2024-01-27 NOTE — ED PROVIDER NOTES
History     Chief Complaint   Patient presents with    Vaginal Bleeding     HPI  Ingrid Vickers is a 58 year old female who presents to the ED tonight with recurrent postmenopausal vaginal bleeding.  This is the third time its happened in the past week.  Usually happens in the morning.  Blood is bright red.  This is the first time she has noticed a clot.  Bleeding has since stopped once again.  She called the triage nurse after the second time and was told if it happened again she should go to the ED.    Went through menopause 3 years ago.  Denies any lightheadedness or fevers.  Had about 4 minutes of lower abdominal cramping when she was working out this past week but that went away and she did not have any bleeding at that time.    Has been a teacher for 30 years.  Exercises regularly and drinks plenty of water    Negative Pap smear in August 2021.  Negative HPV.        Allergies:  Allergies   Allergen Reactions    Verapamil      Rash       Problem List:    Patient Active Problem List    Diagnosis Date Noted    Papanicolaou smear of cervix with low grade squamous intraepithelial lesion (LGSIL)      Priority: Medium     7/8/99 LSIL  Care everywhere  7/26/99 Colposcopy:  LSIL Care everywhere  12/2/99  LSIL   Care everywhere  4/19/00 ASCUS   Care everywhere  12/17/01 NIL  10/7/02 NIL  2004,2006,2007,2008, 2012 4/11/16 NIL/neg HR HPV. Plan: cotest in 5 years.   8/3/2021 NIL pap, neg HPV. Routine screening      Hypertension goal BP (blood pressure) < 140/90 05/29/2012     Priority: Medium        Past Medical History:    Past Medical History:   Diagnosis Date    ASCUS favor benign 4/19/00    Papanicolaou smear of cervix with low grade squamous intraepithelial lesion (LGSIL)     Thoracic or lumbosacral neuritis or radiculitis, unspecified 2001    Transient hypertension of pregnancy, antepartum     Uncomplicated asthma 1980    Unspecified asthma(493.90)     Unspecified hemorrhoids without mention of complication   "      Past Surgical History:    Past Surgical History:   Procedure Laterality Date    BREAST SURGERY  2013    Augmentation    HC ABLATION, ENDOMETRIAL, THERMAL, W/O HYSTEROSCOPIC GUIDANCE      PHOTOREFRACTIVE KERATECTOMY         Family History:    Family History   Problem Relation Age of Onset    Hypertension Mother     Genitourinary Problems Mother         Hysterectomy due to pre-cancerous reasons cervical    Heart Disease Maternal Grandfather     Cerebrovascular Disease Paternal Grandfather     Depression Father         manic-depressive    Psychotic Disorder Father         \"manic\"    Mental Illness Father         Bi polar    Depression Sister     Depression Sister     Psychotic Disorder Son         manic    Depression Sister     Anxiety Disorder Sister     Substance Abuse Son         In recovery for almost 3 years from Heroin    Clotting Disorder Son     Other - See Comments Son 25        blood clots in the brain        Social History:  Marital Status:   [2]  Social History     Tobacco Use    Smoking status: Never     Passive exposure: Never    Smokeless tobacco: Never    Tobacco comments:     no smokers in household   Vaping Use    Vaping Use: Never used   Substance Use Topics    Alcohol use: Yes     Comment: few times a week    Drug use: No        Medications:    lisinopril-hydrochlorothiazide (ZESTORETIC) 10-12.5 MG tablet          Review of Systems   All other systems reviewed and are negative.      Physical Exam   BP: (!) 142/113  Pulse: 96  Temp: 97.9  F (36.6  C)  Resp: 18  SpO2: 100 %      Physical Exam  Constitutional:       General: She is not in acute distress.     Appearance: Normal appearance.   Abdominal:      Tenderness: There is abdominal tenderness (mild/mod lower, L>R). There is no guarding or rebound.   Neurological:      Mental Status: She is alert.         ED Course                 Procedures              Critical Care time:  none               Results for orders placed or performed " during the hospital encounter of 01/27/24 (from the past 24 hour(s))   CBC with platelets differential    Narrative    The following orders were created for panel order CBC with platelets differential.  Procedure                               Abnormality         Status                     ---------                               -----------         ------                     CBC with platelets and d...[[id number]]                      Final result                 Please view results for these tests on the individual orders.   ABO/Rh type and screen    Narrative    The following orders were created for panel order ABO/Rh type and screen.  Procedure                               Abnormality         Status                     ---------                               -----------         ------                     Adult Type and Screen[[id number]]                            Edited Result - FINAL        Please view results for these tests on the individual orders.   Comprehensive metabolic panel   Result Value Ref Range    Sodium 136 135 - 145 mmol/L    Potassium 4.2 3.4 - 5.3 mmol/L    Carbon Dioxide (CO2) 22 22 - 29 mmol/L    Anion Gap 14 7 - 15 mmol/L    Urea Nitrogen 18.2 6.0 - 20.0 mg/dL    Creatinine 1.05 (H) 0.51 - 0.95 mg/dL    GFR Estimate 62 >60 mL/min/1.73m2    Calcium 8.7 8.6 - 10.0 mg/dL    Chloride 100 98 - 107 mmol/L    Glucose 93 70 - 99 mg/dL    Alkaline Phosphatase 69 40 - 150 U/L    AST 20 0 - 45 U/L    ALT 13 0 - 50 U/L    Protein Total 6.9 6.4 - 8.3 g/dL    Albumin 4.1 3.5 - 5.2 g/dL    Bilirubin Total 0.3 <=1.2 mg/dL   INR   Result Value Ref Range    INR 1.03 0.85 - 1.15   Partial thromboplastin time   Result Value Ref Range    aPTT 34 22 - 38 Seconds   TSH with free T4 reflex   Result Value Ref Range    TSH 3.20 0.30 - 4.20 uIU/mL   CBC with platelets and differential   Result Value Ref Range    WBC Count 6.3 4.0 - 11.0 10e3/uL    RBC Count 4.61 3.80 - 5.20 10e6/uL    Hemoglobin 14.9 11.7 - 15.7 g/dL     Hematocrit 42.7 35.0 - 47.0 %    MCV 93 78 - 100 fL    MCH 32.3 26.5 - 33.0 pg    MCHC 34.9 31.5 - 36.5 g/dL    RDW 12.4 10.0 - 15.0 %    Platelet Count 251 150 - 450 10e3/uL    % Neutrophils 50 %    % Lymphocytes 36 %    % Monocytes 9 %    % Eosinophils 4 %    % Basophils 1 %    % Immature Granulocytes 0 %    NRBCs per 100 WBC 0 <1 /100    Absolute Neutrophils 3.1 1.6 - 8.3 10e3/uL    Absolute Lymphocytes 2.2 0.8 - 5.3 10e3/uL    Absolute Monocytes 0.6 0.0 - 1.3 10e3/uL    Absolute Eosinophils 0.3 0.0 - 0.7 10e3/uL    Absolute Basophils 0.1 0.0 - 0.2 10e3/uL    Absolute Immature Granulocytes 0.0 <=0.4 10e3/uL    Absolute NRBCs 0.0 10e3/uL   Adult Type and Screen   Result Value Ref Range    ABO/RH(D) O POS     Antibody Screen Negative Negative    SPECIMEN EXPIRATION DATE [id number]    UA with Microscopic reflex to Culture    Specimen: Urine, NOS   Result Value Ref Range    Color Urine Yellow Colorless, Straw, Light Yellow, Yellow    Appearance Urine Clear Clear    Glucose Urine Negative Negative mg/dL    Bilirubin Urine Negative Negative    Ketones Urine Negative Negative mg/dL    Specific Gravity Urine 1.015 1.003 - 1.035    Blood Urine Moderate (A) Negative    pH Urine 5.0 5.0 - 7.0    Protein Albumin Urine Negative Negative mg/dL    Urobilinogen Urine Normal Normal, 2.0 mg/dL    Nitrite Urine Negative Negative    Leukocyte Esterase Urine Large (A) Negative    Bacteria Urine Few (A) None Seen /HPF    WBC Clumps Urine Present (A) None Seen /HPF    Mucus Urine Present (A) None Seen /LPF    RBC Urine 4 (H) <=2 /HPF    WBC Urine 25 (H) <=5 /HPF    Squamous Epithelials Urine 3 (H) <=1 /HPF    Transitional Epithelials Urine <1 <=1 /HPF    Narrative    Urine Culture ordered based on laboratory criteria   US Pelvic Complete w Transvaginal    Narrative    EXAM: ULTRASOUND PELVIC TRANSABDOMINAL AND TRANSVAGINAL  LOCATION: [facility]  DATE: 01/27/2024    INDICATION: Postmenopausal vaginal  bleeding, lower abdominal pain. History of ablation.  COMPARISON: None.  TECHNIQUE: Transabdominal scans were performed. Endovaginal ultrasound was performed to better visualize the adnexa.    FINDINGS:    UTERUS: 5.7 x 5.0 x 5.0 cm. Multiple uterine fibroids with a posterior left uterine fibroid measuring 2.1 x 1.9 x 1.9 cm. A posterior fundal fibroid measures 1.2 x 1.1 x 1.1 cm. Anterior right uterine fibroid measuring 2.8 x 2.3 x 2.7 cm.    ENDOMETRIUM: 7 mm. Difficult to visualize due to fibroids. Fluid within the endometrial cavity near the lower uterine segment and endocervical region. Within this area, there is a small ovoid filling defect or echogenic nodule measuring 0.6 cm.   Underlying endometrial polyp or mass cannot be excluded. Further superiorly, there is another filling defect measuring 0.7 cm having a more hyperechoic appearance suspicious for a polyp or mass.    RIGHT OVARY: Not visualized due to bowel gas.    LEFT OVARY: Not visualized due to bowel gas.    No free fluid in the pelvis.      Impression    IMPRESSION:    1.  Filling defects within the endometrial cavity, one of which measures 0.7 cm and is echogenic in appearance and another more hypoechoic filling defect measuring 0.6 cm in the endocervical region. Underlying endometrial polyps creating postmenopausal   bleeding or endometrial masses cannot be excluded. Recommend tissue diagnosis.    2.  Multiple uterine fibroids.    3.  Neither ovary visualized due to bowel gas.         Medications - No data to display    Assessments & Plan (with Medical Decision Making)  58-year-old female with 3 episodes of postmenopausal vaginal bleeding this past week.  Bright red blood.  Tonight was the first time she saw a clot.  No longer bleeding.  Postmenopausal x 3 years.  Normal Pap in August 2021.  Blood pressure up a bit.  Otherwise her vitals are unremarkable.  Mild to moderate lower abdominal tenderness palpation left greater than right.  Pelvic  ultrasound and labs ordered.  Will defer pelvic exam as she will need to see GYN for close follow-up anyway.  Hemoglobin normal at 14.9.  Rest of her labs were unremarkable except her creatinine is 1.05.  COVID ultrasound shows thickened endometrial stripe at 0.7 cm with multiple uterine fibroids.  There is some filling defects in endometrial cavity.  Underlying endometrial polyps or masses cannot be excluded.  She will need a tissue diagnosis.  She is not bleeding at this time and the bleeding has not been heavy.  She follow-up with gynecology as an outpatient.  She will ultimately need an endometrial biopsy or D&C for tissue diagnosis.  I placed an order for GYN consult and sent a note to her primary provider, Dr. Lanza to keep her in the loop as well.     I have reviewed the nursing notes.    I have reviewed the findings, diagnosis, plan and need for follow up with the patient.           Medical Decision Making  The patient's presentation was of moderate complexity (an undiagnosed new problem with uncertain diagnosis).    The patient's evaluation involved:  ordering and/or review of 3+ test(s) in this encounter (see separate area of note for details)    The patient's management necessitated moderate risk (a decision regarding minor procedure (will need Sundar biopsy or D&C for tissue diagnosis) with risk factors of none).        Discharge Medication List as of 1/27/2024  5:55 AM          Final diagnoses:   Post-menopausal bleeding   Uterine leiomyoma, unspecified location   Endometrial thickening on ultrasound       1/27/2024   [facility] EMERGENCY DEPT       Brett Carr MD  01/27/24 0609

## 2024-01-27 NOTE — ED TRIAGE NOTES
Patient presents with vaginal bleeding episode. States she is menopausal and does not get periods. Reports this is the third episode over the last week. Was told by phone triage to be seen if it happens again. Only medication is BP meds.     Triage Assessment (Adult)       Row Name 01/27/24 0342          Triage Assessment    Airway WDL WDL        Respiratory WDL    Respiratory WDL WDL        Skin Circulation/Temperature WDL    Skin Circulation/Temperature WDL WDL        Cardiac WDL    Cardiac WDL WDL        Peripheral/Neurovascular WDL    Peripheral Neurovascular WDL WDL        Cognitive/Neuro/Behavioral WDL    Cognitive/Neuro/Behavioral WDL WDL

## 2024-01-27 NOTE — DISCHARGE INSTRUCTIONS
Expect a call from the schedulers to arrange a gynecology appointment.   You will eventually need an endometrial biopsy or D&C to get a tissue diagnosis.  If your urine culture shows anything that requires an antibiotic, we will contact you.  Please return to the ED if you develop brisk vaginal bleeding soaking more than 1 pad an hour for 3 hours, dizziness/lightheadedness, fever over 100.4 or any concerns.  It was an absolute pleasure visiting with you this morning.  I wish you the very best going forward!    Thank you for choosing [facility]. We appreciate the opportunity to meet your urgent medical needs. Please let us know if we could have done anything to make your stay more satisfying.    After discharge, please closely monitor for any new or worsening symptoms. Return to the Emergency Department if you develop any acute worsening signs or symptoms.    If you had lab work, cultures or imaging studies done during your stay, the final results may still be pending. We will call you if your plan of care needs to change. However, if you are not improving as expected, please follow up with your primary care provider or clinic.     Start any prescription medications that were prescribed to you and take them as directed.     Please see additional handouts that may be pertinent to your condition.

## 2024-01-29 ENCOUNTER — TELEPHONE (OUTPATIENT)
Dept: FAMILY MEDICINE | Facility: OTHER | Age: 59
End: 2024-01-29
Payer: COMMERCIAL

## 2024-01-29 NOTE — TELEPHONE ENCOUNTER
Patient seen in ED this weekend and needs gyn follow-up.  If not scheduled for her yet, please consider assisting in scheduling  Lindsey Vallejo MD

## 2024-02-06 ENCOUNTER — TELEPHONE (OUTPATIENT)
Dept: OBGYN | Facility: CLINIC | Age: 59
End: 2024-02-06

## 2024-02-06 NOTE — TELEPHONE ENCOUNTER
This encounter is being sent to inform the clinic that this patient has a referral from Brett Carr MD  for the diagnoses of Uterine leiomyoma  and has requested that this patient be seen within 1-2 weeks and/or with MD.  Based on the availability of our provider(s), we are unable to accommodate this request.    Were all sites offered this patient?  Yes    Does scheduling algorithm request to schedule next available?  Patient has been scheduled for the first available opening with Dr. Mcdaniels on 2/21.  We have informed the patient that the clinic will review their referral and reach out if a sooner appointment is medically necessary.

## 2024-02-07 NOTE — TELEPHONE ENCOUNTER
Assisted pt in scheduling an appt with Dr. Hargrove next Wednesday, 2/14, in [place].    Pearl Bethea RN

## 2024-02-13 NOTE — PATIENT INSTRUCTIONS
If you have labs or imaging done, the results will automatically release in [facility] without an interpretation.  Your health care professional will review those results and send an interpretation with recommendations as soon as possible, but this may be 1-3 business days.    If you have any questions regarding your visit, please contact your care team.     [employer] [facility] Services: [phone]  [facility] CLINIC HOURS TELEPHONE NUMBER   Kendall CASAREZ Delvin .      Mary-KAILEE Kang-KAILEE Mazariegos-Surgery Scheduler  Nora-         Monday-[facility] [place]  8:00 am-4:00 pm  [facility]  [facility]00 [facility]  Wednesday-[facility] [place] 8:00 am-4:00 pm  Thursday-[place] 8:00 am-4:00 pm    Typical Surgery Day Friday [facility]  [address] [place] [address]  PH: [phone] Fax    Imaging Scheduling all locations  PH: [phone]     [facility] Delivery  [address]  [place], [place] [address] [phone]    [facility]  [address] [place] [address]  PH: [phone]     **Surgeries** Our Surgery Schedulers will contact you to schedule. If you do not receive a call within 3 business days, please call [phone].  Urgent Care locations:  [facility]       Monday-Friday   10 am - 8 pm  Saturday and Sunday   9 am - 5 pm   [phone]   If you need a medication refill, please contact your pharmacy. Please allow 3 business days for your refill to be completed.  As always, Thank you for trusting us with your healthcare needs!

## 2024-02-14 ENCOUNTER — OFFICE VISIT (OUTPATIENT)
Dept: OBGYN | Facility: CLINIC | Age: 59
End: 2024-02-14
Payer: COMMERCIAL

## 2024-02-14 VITALS — OXYGEN SATURATION: 96 % | DIASTOLIC BLOOD PRESSURE: 91 MMHG | SYSTOLIC BLOOD PRESSURE: 132 MMHG | HEART RATE: 87 BPM

## 2024-02-14 DIAGNOSIS — N95.0 POST-MENOPAUSAL BLEEDING: ICD-10-CM

## 2024-02-14 DIAGNOSIS — D25.9 UTERINE LEIOMYOMA, UNSPECIFIED LOCATION: ICD-10-CM

## 2024-02-14 DIAGNOSIS — R93.89 ENDOMETRIAL THICKENING ON ULTRASOUND: ICD-10-CM

## 2024-02-14 PROCEDURE — 99204 OFFICE O/P NEW MOD 45 MIN: CPT | Performed by: GENERAL PRACTICE

## 2024-02-14 NOTE — PROGRESS NOTES
HPI:   Ingrid is a 58 year old  here for post menopausal bleeding. Bleeding started about 1 month ago. Sporadic in nature. Has now been bleeding mostly everyday. On ROS, had significant pain about 5 months ago in the abdomen which resolved spontaneously.         ROS:   Weight loss or gain: denies  Abdominal bloating / pain: denies  Appetite: normal  Energy: normal  Nausea / vomiting: denies  Fevers / chills / night sweats: denies  SOB / cough: denies  Chest pain / palpitations: denies  Diarrhea / constipation: denies  Bloody / tar-colored stools: denies  Urinary frequency / urgency / blood: denies  Headaches / vision changes: denies  Mouth sores: denies  Skin changes / rashes: denies      GYNHx:   No LMP recorded. Patient is perimenopausal.  Last paps:    Lab Results   Component Value Date    PAP NIL 2016    PAP NIL 2012    PAP NIL 2008         OBHx:  OB History    Para Term  AB Living   4 2 2 0 2 2   SAB IAB Ectopic Multiple Live Births   1 0 1 0 0      # Outcome Date GA Lbr Abran/2nd Weight Sex Delivery Anes PTL Lv   4 Term            3 Term            2 SAB            1 Ectopic                 PSH:   Past Surgical History:   Procedure Laterality Date    BREAST SURGERY  2013    Augmentation    HC ABLATION, ENDOMETRIAL, THERMAL, W/O HYSTEROSCOPIC GUIDANCE      PHOTOREFRACTIVE KERATECTOMY         PMH:  Past Medical History:   Diagnosis Date    ASCUS favor benign 00    Papanicolaou smear of cervix with low grade squamous intraepithelial lesion (LGSIL)     Thoracic or lumbosacral neuritis or radiculitis, unspecified     Transient hypertension of pregnancy, antepartum     Uncomplicated asthma     No longer an issue    Unspecified asthma(493.90)     Hx for wheezing and asthma    Unspecified hemorrhoids without mention of complication         MEDs   Current Outpatient Medications   Medication    lisinopril-hydrochlorothiazide (ZESTORETIC) 10-12.5 MG tablet     No current  "facility-administered medications for this visit.       Allergies:  Allergies   Allergen Reactions    Verapamil      Rash       FamHx:  Family History   Problem Relation Age of Onset    Hypertension Mother     Genitourinary Problems Mother         Hysterectomy due to pre-cancerous reasons cervical    Heart Disease Maternal Grandfather     Cerebrovascular Disease Paternal Grandfather     Depression Father         manic-depressive    Psychotic Disorder Father         \"manic\"    Mental Illness Father         Bi polar    Depression Sister     Depression Sister     Psychotic Disorder Son         manic    Depression Sister     Anxiety Disorder Sister     Substance Abuse Son         In recovery for almost 3 years from Heroin    Clotting Disorder Son     Other - See Comments Son 25        blood clots in the brain        SocHx:  Social History     Socioeconomic History    Marital status:      Spouse name: Elijah    Number of children: 2    Years of education: 27    Highest education level: Not on file   Occupational History    Occupation:      Employer: [employer]   Tobacco Use    Smoking status: Never     Passive exposure: Never    Smokeless tobacco: Never    Tobacco comments:     no smokers in household   Vaping Use    Vaping Use: Never used   Substance and Sexual Activity    Alcohol use: Yes     Comment: few times a week    Drug use: No    Sexual activity: Yes     Partners: Male     Birth control/protection: Male Surgical     Comment: vasectomy   Other Topics Concern    Parent/sibling w/ CABG, MI or angioplasty before 65F 55M? No   Social History Narrative    Not on file     Social Determinants of Health     Financial Resource Strain: Not on file   Food Insecurity: Not on file   Transportation Needs: Not on file   Physical Activity: Not on file   Stress: Not on file   Social Connections: Not on file   Interpersonal Safety: Not on file   Housing Stability: Not on file             PE: "   BP (!) 132/91 (BP Location: Right arm, Patient Position: Sitting, Cuff Size: Adult Regular)   Pulse 87   SpO2 96%   There is no height or weight on file to calculate BMI.    General Appearance:  healthy, alert, active, no distress  HEENT: NC/AT, supple, trachea midline, no goiter  CV: regular rate and rhythm  Lungs: clear to auscultation bilaterally  Breast: not performed  Neuro: normal gait, balance  Skin: no lesions, visible rashes/bruising  Psych: appropriate mood/affect  Abdomen: Benign and Soft, non-tender.   Pelvic: deferred      RADS  EXAM: ULTRASOUND PELVIC TRANSABDOMINAL AND TRANSVAGINAL  LOCATION: [facility]  DATE: 01/27/2024     INDICATION: Postmenopausal vaginal bleeding, lower abdominal pain. History of ablation.  COMPARISON: None.  TECHNIQUE: Transabdominal scans were performed. Endovaginal ultrasound was performed to better visualize the adnexa.     FINDINGS:     UTERUS: 5.7 x 5.0 x 5.0 cm. Multiple uterine fibroids with a posterior left uterine fibroid measuring 2.1 x 1.9 x 1.9 cm. A posterior fundal fibroid measures 1.2 x 1.1 x 1.1 cm. Anterior right uterine fibroid measuring 2.8 x 2.3 x 2.7 cm.     ENDOMETRIUM: 7 mm. Difficult to visualize due to fibroids. Fluid within the endometrial cavity near the lower uterine segment and endocervical region. Within this area, there is a small ovoid filling defect or echogenic nodule measuring 0.6 cm.   Underlying endometrial polyp or mass cannot be excluded. Further superiorly, there is another filling defect measuring 0.7 cm having a more hyperechoic appearance suspicious for a polyp or mass.     RIGHT OVARY: Not visualized due to bowel gas.     LEFT OVARY: Not visualized due to bowel gas.     No free fluid in the pelvis.                                                                      IMPRESSION:     1.  Filling defects within the endometrial cavity, one of which measures 0.7 cm and is echogenic in appearance and  another more hypoechoic filling defect measuring 0.6 cm in the endocervical region. Underlying endometrial polyps creating postmenopausal   bleeding or endometrial masses cannot be excluded. Recommend tissue diagnosis.     2.  Multiple uterine fibroids.     3.  Neither ovary visualized due to bowel gas.          LABS  None for review         A/P:  57 yo female with post menopausal bleeding. Reviewed possible causes for bleeding to include atrophy, polyp, hyper/neoplasia. Pelvic ultrasound noting thickened lining to 7mm and multiple filing defects. We discussed options for evaluation to include embx today in clinic vs hysteroscopy and D&C. Disadvantage of embx being that is does not sample a large portion of the endometrium and with her uterine fibroids and filling defects, I am concerned that there is a potential to miss the possible etiology. For this reason, hysteroscopy, D&C is recommended. Patient in agreement with plan of care. We reviewed the risks of surgery including risk of infection, bleeding, damage to surrounding structures or organs including bladder, ureters, bowel, blood vessels, or nerves. Risk of need for additional procedures or blood transfusion if complications. Blood transfusions carry additional risks of transfusion reactions, damaged to lung/kidneys, fevers, transmission of infectious disease, death. Risk of blood clot/pulmonary embolism, which can be fatal.     Plan:    Procedure: Hysteroscopy, Dilation and Curettage  Hypertension: Patient has normal EKF in July 2023 and normal echo in August 2023.   Details of the procedure were discussed with the patient.  Risks include, but are not limited to, bleeding, infection, and injury to surrounding organs such as the bowel, urinary system, nerves, and blood vessels.  Injury may result in repair at the time of the surgery or in a separate procedure.  All questions answered, and accepting these risks, the patient elects to proceed with the  procedure.  Medications are to be stopped before surgery. May take lisinopril on day of surgery  Discontinue ASA and NSAIDS 5 days prior to surgery to reduce bleeding risk.  She is cleared for surgery          50 min spent on the date of the encounter in chart review, patient visit, review of tests, documentation and/or discussion with other providers about the issues documented above.     Kendall Hargrove DO, FACOG

## 2024-02-16 ENCOUNTER — TELEPHONE (OUTPATIENT)
Dept: OBGYN | Facility: CLINIC | Age: 59
End: 2024-02-16
Payer: COMMERCIAL

## 2024-02-19 NOTE — CONFIDENTIAL NOTE
[facility] Call Center    Phone Message    May a detailed message be left on voicemail: yes     Reason for Call: Pt calling to schedule cystroscopy. Pt stated she was supposed to get a call on Friday and is concerned because provider was hoping to get pt in this Friday.   Please call pt Thank you    Action Taken: Message routed to:  Other: [facility]    Travel Screening: Not Applicable

## 2024-02-20 ENCOUNTER — TELEPHONE (OUTPATIENT)
Dept: OBGYN | Facility: CLINIC | Age: 59
End: 2024-02-20
Payer: COMMERCIAL

## 2024-02-20 ENCOUNTER — PREP FOR PROCEDURE (OUTPATIENT)
Dept: OBGYN | Facility: CLINIC | Age: 59
End: 2024-02-20
Payer: COMMERCIAL

## 2024-02-20 ENCOUNTER — ANESTHESIA EVENT (OUTPATIENT)
Dept: SURGERY | Facility: AMBULATORY SURGERY CENTER | Age: 59
End: 2024-02-20
Payer: COMMERCIAL

## 2024-02-20 DIAGNOSIS — N95.0 POSTMENOPAUSAL BLEEDING: Primary | ICD-10-CM

## 2024-02-20 NOTE — TELEPHONE ENCOUNTER
Associated Diagnoses    Postmenopausal bleeding [N95.0]  - Primary        Source Order Set    Order Set Name Order ID    [id number]     Case Request: Case Info    Panel 1    Providers    Provider Role Service   Kendall Hargrove DO Primary      Procedures    Procedure Laterality Anesthesia Region   HYSTEROSCOPY WITH DILATION AND CURETTAGE OF UTERUS USING MORCELLATOR N/A MAC with Local Vagina                  Requested date:   Location:  OR   Patient class:      Pre-op diagnoses: Postmenopausal bleeding     Scheduling Instructions    Surgical Assistant: No  Multi Surgeon Case No  CSC okay No  H&P:  Pre-op options: Surgeon- completed  Post-op:  NA  Sterilization consent:  Not applicable to procedure being performed.  Vendor: No  Surgical time needed: 60 Minutes  ERAS patient: No  If scheduling for D&E does Laminaria placement: No   SURGERY SCHEDULING AND PRECERTIFICATION    Medical Record Number: [id number]  Ingrid Vickers  YOB: 1965   Phone: [phone] (home) [phone] (work)  Primary Provider: Lin Lanza    Reason for Admit:  ICD-10 CODE:  N95.0    Surgeon: Kendall Hargrove DO  Surgical Procedure: HYSTEROSCOPY WITH DILATION AND CURETTAGE OF UTERUS USING MORCELLATOR    Date of Surgery 02/23 Time of Surgery 9:30am  Surgery to be performed at:  [facility]   Status: Outpatient  Type of Anesthesia Anticipated: Local with MAC    Sterilization consent:  Not applicable to procedure being performed.    Pre-Op: On 02/14 with Dr Hargrove at [place]  COVID testing:  Per Provider's discretion Covid testing is not indicated.     Post-Op:  n/a    Pre-certification routed to Financial Counselors:  Auto routes via Case Request    Surgery packet mailed to patient's home address: Yes  Patient instructed NPO 12 hours prior to surgery, arrive according to the time the nurse gives patient when called prior to surgery, must have a .  Patient understood and agrees to  the plan.      Requestor:  Christina Moreno     Location:  [facility]'s [phone]

## 2024-02-23 ENCOUNTER — ANESTHESIA (OUTPATIENT)
Dept: SURGERY | Facility: AMBULATORY SURGERY CENTER | Age: 59
End: 2024-02-23
Payer: COMMERCIAL

## 2024-02-23 ENCOUNTER — HOSPITAL ENCOUNTER (OUTPATIENT)
Facility: AMBULATORY SURGERY CENTER | Age: 59
Discharge: HOME | End: 2024-02-23
Attending: GENERAL PRACTICE
Payer: COMMERCIAL

## 2024-02-23 VITALS
TEMPERATURE: 96.1 F | RESPIRATION RATE: 16 BRPM | HEART RATE: 75 BPM | DIASTOLIC BLOOD PRESSURE: 96 MMHG | SYSTOLIC BLOOD PRESSURE: 143 MMHG | OXYGEN SATURATION: 98 % | HEIGHT: 62 IN | WEIGHT: 135 LBS | BODY MASS INDEX: 24.84 KG/M2

## 2024-02-23 PROCEDURE — 88305 TISSUE EXAM BY PATHOLOGIST: CPT | Performed by: PATHOLOGY

## 2024-02-23 PROCEDURE — 58558 HYSTEROSCOPY BIOPSY: CPT | Performed by: GENERAL PRACTICE

## 2024-02-23 PROCEDURE — 58558 HYSTEROSCOPY BIOPSY: CPT | Performed by: NURSE ANESTHETIST, CERTIFIED REGISTERED

## 2024-02-23 PROCEDURE — G8907 PT DOC NO EVENTS ON DISCHARG: HCPCS

## 2024-02-23 PROCEDURE — 58558 HYSTEROSCOPY BIOPSY: CPT

## 2024-02-23 PROCEDURE — G8918 PT W/O PREOP ORDER IV AB PRO: HCPCS

## 2024-02-23 PROCEDURE — 58558 HYSTEROSCOPY BIOPSY: CPT | Performed by: ANESTHESIOLOGY

## 2024-02-23 RX ORDER — ACETAMINOPHEN 325 MG/1
975 TABLET ORAL ONCE
Status: COMPLETED | OUTPATIENT
Start: 2024-02-23 | End: 2024-02-23

## 2024-02-23 RX ORDER — OXYCODONE HYDROCHLORIDE 5 MG/1
10 TABLET ORAL
Status: DISCONTINUED | OUTPATIENT
Start: 2024-02-23 | End: 2024-02-24 | Stop reason: HOSPADM

## 2024-02-23 RX ORDER — LORAZEPAM 2 MG/ML
.5-1 INJECTION INTRAMUSCULAR
Status: DISCONTINUED | OUTPATIENT
Start: 2024-02-23 | End: 2024-02-24 | Stop reason: HOSPADM

## 2024-02-23 RX ORDER — ACETAMINOPHEN 325 MG/1
975 TABLET ORAL ONCE
Status: DISCONTINUED | OUTPATIENT
Start: 2024-02-23 | End: 2024-02-24 | Stop reason: HOSPADM

## 2024-02-23 RX ORDER — FENTANYL CITRATE 50 UG/ML
50 INJECTION, SOLUTION INTRAMUSCULAR; INTRAVENOUS EVERY 5 MIN PRN
Status: DISCONTINUED | OUTPATIENT
Start: 2024-02-23 | End: 2024-02-24 | Stop reason: HOSPADM

## 2024-02-23 RX ORDER — SODIUM CHLORIDE, SODIUM LACTATE, POTASSIUM CHLORIDE, CALCIUM CHLORIDE 600; 310; 30; 20 MG/100ML; MG/100ML; MG/100ML; MG/100ML
INJECTION, SOLUTION INTRAVENOUS CONTINUOUS
Status: DISCONTINUED | OUTPATIENT
Start: 2024-02-23 | End: 2024-02-24 | Stop reason: HOSPADM

## 2024-02-23 RX ORDER — MEPERIDINE HYDROCHLORIDE 25 MG/ML
12.5 INJECTION INTRAMUSCULAR; INTRAVENOUS; SUBCUTANEOUS EVERY 5 MIN PRN
Status: DISCONTINUED | OUTPATIENT
Start: 2024-02-23 | End: 2024-02-24 | Stop reason: HOSPADM

## 2024-02-23 RX ORDER — HYDRALAZINE HYDROCHLORIDE 20 MG/ML
2.5-5 INJECTION INTRAMUSCULAR; INTRAVENOUS EVERY 10 MIN PRN
Status: DISCONTINUED | OUTPATIENT
Start: 2024-02-23 | End: 2024-02-24 | Stop reason: HOSPADM

## 2024-02-23 RX ORDER — DIMENHYDRINATE 50 MG/ML
25 INJECTION, SOLUTION INTRAMUSCULAR; INTRAVENOUS
Status: DISCONTINUED | OUTPATIENT
Start: 2024-02-23 | End: 2024-02-24 | Stop reason: HOSPADM

## 2024-02-23 RX ORDER — LIDOCAINE HYDROCHLORIDE AND EPINEPHRINE 10; 10 MG/ML; UG/ML
INJECTION, SOLUTION INFILTRATION; PERINEURAL PRN
Status: DISCONTINUED | OUTPATIENT
Start: 2024-02-23 | End: 2024-02-23 | Stop reason: HOSPADM

## 2024-02-23 RX ORDER — ALBUTEROL SULFATE 0.83 MG/ML
2.5 SOLUTION RESPIRATORY (INHALATION) EVERY 4 HOURS PRN
Status: DISCONTINUED | OUTPATIENT
Start: 2024-02-23 | End: 2024-02-24 | Stop reason: HOSPADM

## 2024-02-23 RX ORDER — PROPOFOL 10 MG/ML
INJECTION, EMULSION INTRAVENOUS CONTINUOUS PRN
Status: DISCONTINUED | OUTPATIENT
Start: 2024-02-23 | End: 2024-02-23

## 2024-02-23 RX ORDER — FENTANYL CITRATE 50 UG/ML
INJECTION, SOLUTION INTRAMUSCULAR; INTRAVENOUS PRN
Status: DISCONTINUED | OUTPATIENT
Start: 2024-02-23 | End: 2024-02-23

## 2024-02-23 RX ORDER — LIDOCAINE 40 MG/G
CREAM TOPICAL
Status: DISCONTINUED | OUTPATIENT
Start: 2024-02-23 | End: 2024-02-24 | Stop reason: HOSPADM

## 2024-02-23 RX ORDER — OXYCODONE HYDROCHLORIDE 5 MG/1
5 TABLET ORAL
Status: DISCONTINUED | OUTPATIENT
Start: 2024-02-23 | End: 2024-02-24 | Stop reason: HOSPADM

## 2024-02-23 RX ORDER — KETOROLAC TROMETHAMINE 30 MG/ML
30 INJECTION, SOLUTION INTRAMUSCULAR; INTRAVENOUS ONCE
Status: COMPLETED | OUTPATIENT
Start: 2024-02-23 | End: 2024-02-23

## 2024-02-23 RX ORDER — PROPOFOL 10 MG/ML
INJECTION, EMULSION INTRAVENOUS PRN
Status: DISCONTINUED | OUTPATIENT
Start: 2024-02-23 | End: 2024-02-23

## 2024-02-23 RX ORDER — ONDANSETRON 4 MG/1
4 TABLET, ORALLY DISINTEGRATING ORAL EVERY 30 MIN PRN
Status: DISCONTINUED | OUTPATIENT
Start: 2024-02-23 | End: 2024-02-24 | Stop reason: HOSPADM

## 2024-02-23 RX ORDER — ONDANSETRON 2 MG/ML
4 INJECTION INTRAMUSCULAR; INTRAVENOUS EVERY 30 MIN PRN
Status: DISCONTINUED | OUTPATIENT
Start: 2024-02-23 | End: 2024-02-24 | Stop reason: HOSPADM

## 2024-02-23 RX ORDER — HALOPERIDOL 5 MG/ML
1 INJECTION INTRAMUSCULAR
Status: DISCONTINUED | OUTPATIENT
Start: 2024-02-23 | End: 2024-02-24 | Stop reason: HOSPADM

## 2024-02-23 RX ORDER — FENTANYL CITRATE 50 UG/ML
25 INJECTION, SOLUTION INTRAMUSCULAR; INTRAVENOUS EVERY 5 MIN PRN
Status: DISCONTINUED | OUTPATIENT
Start: 2024-02-23 | End: 2024-02-24 | Stop reason: HOSPADM

## 2024-02-23 RX ORDER — ACETAMINOPHEN 325 MG/1
975 TABLET ORAL ONCE
Status: CANCELLED | OUTPATIENT
Start: 2024-02-23 | End: 2024-02-23

## 2024-02-23 RX ORDER — KETOROLAC TROMETHAMINE 30 MG/ML
15 INJECTION, SOLUTION INTRAMUSCULAR; INTRAVENOUS
Status: DISCONTINUED | OUTPATIENT
Start: 2024-02-23 | End: 2024-02-24 | Stop reason: HOSPADM

## 2024-02-23 RX ORDER — LIDOCAINE HYDROCHLORIDE 20 MG/ML
INJECTION, SOLUTION INFILTRATION; PERINEURAL PRN
Status: DISCONTINUED | OUTPATIENT
Start: 2024-02-23 | End: 2024-02-23

## 2024-02-23 RX ORDER — DEXAMETHASONE SODIUM PHOSPHATE 4 MG/ML
4 INJECTION, SOLUTION INTRA-ARTICULAR; INTRALESIONAL; INTRAMUSCULAR; INTRAVENOUS; SOFT TISSUE
Status: DISCONTINUED | OUTPATIENT
Start: 2024-02-23 | End: 2024-02-24 | Stop reason: HOSPADM

## 2024-02-23 RX ORDER — HYDROXYZINE HYDROCHLORIDE 25 MG/1
25 TABLET, FILM COATED ORAL EVERY 6 HOURS PRN
Status: DISCONTINUED | OUTPATIENT
Start: 2024-02-23 | End: 2024-02-24 | Stop reason: HOSPADM

## 2024-02-23 RX ORDER — FENTANYL CITRATE 50 UG/ML
25 INJECTION, SOLUTION INTRAMUSCULAR; INTRAVENOUS
Status: DISCONTINUED | OUTPATIENT
Start: 2024-02-23 | End: 2024-02-24 | Stop reason: HOSPADM

## 2024-02-23 RX ORDER — IBUPROFEN 400 MG/1
800 TABLET, FILM COATED ORAL ONCE
Status: CANCELLED | OUTPATIENT
Start: 2024-02-23 | End: 2024-02-23

## 2024-02-23 RX ORDER — LABETALOL HYDROCHLORIDE 5 MG/ML
10 INJECTION, SOLUTION INTRAVENOUS
Status: DISCONTINUED | OUTPATIENT
Start: 2024-02-23 | End: 2024-02-24 | Stop reason: HOSPADM

## 2024-02-23 RX ADMIN — PROPOFOL 60 MG: 10 INJECTION, EMULSION INTRAVENOUS at 09:59

## 2024-02-23 RX ADMIN — PROPOFOL 20 MG: 10 INJECTION, EMULSION INTRAVENOUS at 10:04

## 2024-02-23 RX ADMIN — PROPOFOL 50 MG: 10 INJECTION, EMULSION INTRAVENOUS at 10:11

## 2024-02-23 RX ADMIN — LIDOCAINE HYDROCHLORIDE 40 MG: 20 INJECTION, SOLUTION INFILTRATION; PERINEURAL at 09:59

## 2024-02-23 RX ADMIN — SODIUM CHLORIDE, SODIUM LACTATE, POTASSIUM CHLORIDE, CALCIUM CHLORIDE: 600; 310; 30; 20 INJECTION, SOLUTION INTRAVENOUS at 08:43

## 2024-02-23 RX ADMIN — FENTANYL CITRATE 25 MCG: 50 INJECTION, SOLUTION INTRAMUSCULAR; INTRAVENOUS at 09:57

## 2024-02-23 RX ADMIN — PROPOFOL 150 MCG/KG/MIN: 10 INJECTION, EMULSION INTRAVENOUS at 09:59

## 2024-02-23 RX ADMIN — ACETAMINOPHEN 975 MG: 325 TABLET ORAL at 08:37

## 2024-02-23 RX ADMIN — KETOROLAC TROMETHAMINE 30 MG: 30 INJECTION, SOLUTION INTRAMUSCULAR; INTRAVENOUS at 10:09

## 2024-02-23 NOTE — OP NOTE
OPERATIVE NOTE    Operative Date: 2/23/2024    Surgeon: Kendall Hargrove DO  Assistant:  none    Pre-Operative Diagnosis:  Postmenopausal bleeding    Post-Operative Diagnosis:  same    Anesthesia Type: MAC    Operative Procedure: Hysteroscopy, dilation and curettage      Specimens: Endometrial curettings     Fluids Given: See Anesthesia Record  Urine Output: none  Estimated Blood Loss: 25 mL  Hysteroscopic Fluid Deficit: 600ml NS    Findings:  Stenotic cervix. Uterus sounded to 5 cm. Upon entry with hysteroscope into suspected endometrial cavity, unable to identify bilateral ostea. Concern for creation of false tract. Significant time spent trying to identify if another cervical canal or endometrium endometrial cavity could be identified however this was not successful. Small amount of polypoid tissue was identified in the canal.      Complications:  none    Postoperative Condition: Stable    Procedure:   The risks, benefits, indications, and alternatives to the procedure were reviewed with the patient.  Informed consent was obtained.  MAC anesthesia was found to be adequate.   She was prepped and draped in the normal sterile fashion in the high lithotomy position.  A time out was performed.    A speculum was placed into the vagina and the cervix was easily visualized. A single tooth tenaculum was used to grasped the cervix anteriorly. A paracervical block was performed with 10cc of 1% lidocaine with epinephrine. The cervix was found to be stenotic but serially dilated to 12 [place] followed by introduction of an operative hysteroscope. A survey the endometrial cavity was notable for the above findings. Significanttime spend trying to identify another cervical canal with dilators and with the hysteroscope, however no other canal was identified. The hysteroscope was then removed. Using a sharp curet, a gentle curettage was then performed until a gritty texture was noted in all four quadrants. The tissue specimen was  handed off for pathologic review. Next, the tenaculum was removed and was noted to be hemostatic.  The speculum was then removed from the vagina.     The patient tolerated the procedure well.  The sponge, lap, and needle counts were correct X 2.    The patient was taken to the PACU in stable condition.  There were no operative complications.      Kendall Hargrove, DO

## 2024-02-23 NOTE — DISCHARGE INSTRUCTIONS
Tylenol 975 mg was given at 8:37 AM. Your next dose can be taken after 12:37 PM.    You should not take more then 4,000 mg of tylenol/acetaminophen in a 24 hour period.    You were given an NSAID after surgery. Do not take any more NSAIDs until after 10 PM.

## 2024-02-23 NOTE — ANESTHESIA POSTPROCEDURE EVALUATION
Patient: Ingrid Vickers    Procedure: Procedure(s):  HYSTEROSCOPY WITH DILATION AND CURETTAGE OF UTERUS USING MORCELLATOR       Anesthesia Type:  MAC    Note:  Disposition: Outpatient   Postop Pain Control: Uneventful            Sign Out: Well controlled pain   PONV: No   Neuro/Psych: Uneventful            Sign Out: Acceptable/Baseline neuro status   Airway/Respiratory: Uneventful            Sign Out: Acceptable/Baseline resp. status   CV/Hemodynamics: Uneventful            Sign Out: Acceptable CV status; No obvious hypovolemia; No obvious fluid overload   Other NRE: NONE   DID A NON-ROUTINE EVENT OCCUR? No           Last vitals:  Vitals Value Taken Time   /70 02/23/24 1042   Temp 96.1  F (35.6  C) 02/23/24 1042   Pulse 82 02/23/24 1042   Resp 16 02/23/24 1042   SpO2 94 % 02/23/24 1042       Electronically Signed By: Lino Carpenter MD  February 23, 2024  10:45 AM

## 2024-02-23 NOTE — ANESTHESIA PREPROCEDURE EVALUATION
Anesthesia Pre-Procedure Evaluation    Patient: Ingrid Vickers   MRN: [id number] : 1965        Procedure : Procedure(s):  HYSTEROSCOPY WITH DILATION AND CURETTAGE OF UTERUS USING MORCELLATOR          Past Medical History:   Diagnosis Date    ASCUS favor benign 00    Papanicolaou smear of cervix with low grade squamous intraepithelial lesion (LGSIL)     Thoracic or lumbosacral neuritis or radiculitis, unspecified     Transient hypertension of pregnancy, antepartum     Uncomplicated asthma     No longer an issue    Unspecified asthma(493.90)     Hx for wheezing and asthma    Unspecified hemorrhoids without mention of complication       Past Surgical History:   Procedure Laterality Date    BREAST SURGERY  2013    Augmentation    HC ABLATION, ENDOMETRIAL, THERMAL, W/O HYSTEROSCOPIC GUIDANCE      PHOTOREFRACTIVE KERATECTOMY        Allergies   Allergen Reactions    Verapamil      Rash      Social History     Tobacco Use    Smoking status: Never     Passive exposure: Never    Smokeless tobacco: Never    Tobacco comments:     no smokers in household   Substance Use Topics    Alcohol use: Yes     Comment: few times a week      Wt Readings from Last 1 Encounters:   24 61.2 kg (135 lb)        Anesthesia Evaluation   Pt has had prior anesthetic.     No history of anesthetic complications       ROS/MED HX  ENT/Pulmonary:       Neurologic:       Cardiovascular:       METS/Exercise Tolerance: 4 - Raking leaves, gardening    Hematologic:       Musculoskeletal:       GI/Hepatic:       Renal/Genitourinary:       Endo:       Psychiatric/Substance Use:       Infectious Disease:       Malignancy:       Other:               OUTSIDE LABS:  CBC:   Lab Results   Component Value Date    WBC 6.3 2024    WBC 5.6 2023    HGB 14.9 2024    HGB 13.4 2023    HCT 42.7 2024    HCT 39.8 2023     2024     2023     BMP:   Lab Results   Component Value Date    NA  136 01/27/2024     06/30/2023    POTASSIUM 4.2 01/27/2024    POTASSIUM 3.6 06/30/2023    CHLORIDE 100 01/27/2024    CHLORIDE 105 06/30/2023    CO2 22 01/27/2024    CO2 25 06/30/2023    BUN 18.2 01/27/2024    BUN 19.9 06/30/2023    CR 1.05 (H) 01/27/2024    CR 0.99 (H) 06/30/2023    GLC 93 01/27/2024    GLC 97 06/30/2023     COAGS:   Lab Results   Component Value Date    PTT 34 01/27/2024    INR 1.03 01/27/2024     POC:   Lab Results   Component Value Date    HCG Negative 03/31/2006     HEPATIC:   Lab Results   Component Value Date    ALBUMIN 4.1 01/27/2024    PROTTOTAL 6.9 01/27/2024    ALT 13 01/27/2024    AST 20 01/27/2024    ALKPHOS 69 01/27/2024    BILITOTAL 0.3 01/27/2024     OTHER:   Lab Results   Component Value Date    PH 6.5 04/14/2003    CINDY 8.7 01/27/2024    TSH 3.20 01/27/2024    SED 11 12/17/2001       Anesthesia Plan    ASA Status:  2    NPO Status:  NPO Appropriate    Anesthesia Type: MAC.     - Reason for MAC: immobility needed, straight local not clinically adequate   Induction: Propofol.           Consents    Anesthesia Plan(s) and associated risks, benefits, and realistic alternatives discussed. Questions answered and patient/representative(s) expressed understanding.     - Discussed:     - Discussed with:  Patient      - Extended Intubation/Ventilatory Support Discussed: No.      - Patient is DNR/DNI Status: No     Use of blood products discussed: No .     Postoperative Care    Pain management: IV analgesics, Oral pain medications.   PONV prophylaxis: Ondansetron (or other 5HT-3)     Comments:               Lino Carpenter MD    I have reviewed the pertinent notes and labs in the chart from the past 30 days and (re)examined the patient.  Any updates or changes from those notes are reflected in this note.

## 2024-02-23 NOTE — ANESTHESIA CARE TRANSFER NOTE
Patient: Ingrid Vickers    Procedure: Procedure(s):  HYSTEROSCOPY WITH DILATION AND CURETTAGE OF UTERUS USING MORCELLATOR       Diagnosis: Postmenopausal bleeding [N95.0]  Diagnosis Additional Information: No value filed.    Anesthesia Type:   MAC     Note:      Level of Consciousness: awake  Oxygen Supplementation: room air    Independent Airway: airway patency satisfactory and stable  Dentition: dentition unchanged  Vital Signs Stable: post-procedure vital signs reviewed and stable  Report to RN Given: handoff report given  Patient transferred to: Phase II    Handoff Report: Identifed the Patient, Identified the Reponsible Provider, Reviewed the pertinent medical history, Discussed the surgical course, Reviewed Intra-OP anesthesia mangement and issues during anesthesia, Set expectations for post-procedure period and Allowed opportunity for questions and acknowledgement of understanding      Vitals:  Vitals Value Taken Time   /78    Temp 98    Pulse 75    Resp 12    SpO2 98        Electronically Signed By: EVIE Calvillo CRNA  February 23, 2024  10:38 AM

## 2024-02-23 NOTE — PROGRESS NOTES
Patient seen. H&P reviewed, no pertinent changes noted.  I discussed risks, benefits, and complications of surgery including but not limited to bleeding, infection, damage to nearby organs including but not limited to bladder, bowel, ureters, nerves, and blood vessels as well as anesthesia risks.  Injury may result at the time of surgery or in a separate procedure.  We also discussed the possibility of a reoperation if the pathology came back abnormal.  All questions answered, and accepting these risks, the patient elects to proceed with the procedure.        Kendall Hargrove, DO

## 2024-02-26 DIAGNOSIS — I10 HYPERTENSION GOAL BP (BLOOD PRESSURE) < 140/90: ICD-10-CM

## 2024-02-27 LAB
PATH REPORT.COMMENTS IMP SPEC: NORMAL
PATH REPORT.COMMENTS IMP SPEC: NORMAL
PATH REPORT.FINAL DX SPEC: NORMAL
PATH REPORT.GROSS SPEC: NORMAL
PATH REPORT.MICROSCOPIC SPEC OTHER STN: NORMAL
PATH REPORT.RELEVANT HX SPEC: NORMAL
PHOTO IMAGE: NORMAL

## 2024-02-27 RX ORDER — LISINOPRIL/HYDROCHLOROTHIAZIDE 10-12.5 MG
1 TABLET ORAL DAILY
Qty: 90 TABLET | Refills: 0 | Status: SHIPPED | OUTPATIENT
Start: 2024-02-27 | End: 2024-08-19

## 2024-03-16 ENCOUNTER — HEALTH MAINTENANCE LETTER (OUTPATIENT)
Age: 59
End: 2024-03-16

## 2024-04-04 ENCOUNTER — TRANSFERRED RECORDS (OUTPATIENT)
Dept: HEALTH INFORMATION MANAGEMENT | Facility: CLINIC | Age: 59
End: 2024-04-04
Payer: COMMERCIAL

## 2024-08-06 ENCOUNTER — OFFICE VISIT (OUTPATIENT)
Dept: FAMILY MEDICINE | Facility: OTHER | Age: 59
End: 2024-08-06
Payer: COMMERCIAL

## 2024-08-06 VITALS
RESPIRATION RATE: 20 BRPM | OXYGEN SATURATION: 98 % | HEART RATE: 80 BPM | SYSTOLIC BLOOD PRESSURE: 130 MMHG | TEMPERATURE: 97.5 F | DIASTOLIC BLOOD PRESSURE: 80 MMHG

## 2024-08-06 DIAGNOSIS — M77.01 MEDIAL EPICONDYLITIS OF ELBOW, RIGHT: Primary | ICD-10-CM

## 2024-08-06 PROCEDURE — 99213 OFFICE O/P EST LOW 20 MIN: CPT

## 2024-08-06 ASSESSMENT — PAIN SCALES - GENERAL: PAINLEVEL: EXTREME PAIN (8)

## 2024-08-06 NOTE — PROGRESS NOTES
Assessment & Plan  1. Medial epicondylitis of elbow, right  Patient is a 58 year-old female presenting with concerns of medial epicondylitis (Golfer's Elbow). Pain to palpation of right medial epicondyle with associated pain with flexion and extension of right elbow and wrist. Discussed conservative management including as needed acetaminophen/ibuprofen, ice/heat, gentle stretches, and topical analgesics. Exercises provided in AVS. Discussed application of wrap of brace distal to elbow to create foe tendon insertion point. Referral to [facility] physical therapy placed as preferred by patient. Follow-up if symptoms fail to improve despite above interventions. Patient understands and is agreeable to plan as discussed in clinic.  - Physical Therapy  Referral; Future    Subjective   Nona is a 58 year old, presenting for the following health issues:  Elbow Pain (Right)      8/6/2024     7:46 AM   Additional Questions   Roomed by Martha CROFT         8/6/2024   Declines Weight   Did patient decline having their weight taken? Yes        History of Present Illness       Reason for visit:  Sore elbow. Possibly from over use.    She eats 2-3 servings of fruits and vegetables daily.She consumes 0 sweetened beverage(s) daily.She exercises with enough effort to increase her heart rate 30 to 60 minutes per day.  She exercises with enough effort to increase her heart rate 5 days per week. She is missing 2 dose(s) of medications per week.  She is not taking prescribed medications regularly due to remembering to take.    Pain History:  When did you first notice your pain? June, but worse in the last week    Have you seen anyone else for your pain? No  How has your pain affected your ability to work? Not applicable off for the summer. Can't do normal outdoor and indoor activities   Where in your body do you have pain? Musculoskeletal problem/pain  Onset/Duration: June  Description  Location: elbow - right  Joint Swelling:  No  Redness: No  Pain: YES  Warmth: No  Intensity:  severe  Progression of Symptoms:  worsening  Accompanying signs and symptoms:   Fevers: No  Numbness/tingling/weakness: YES in fingers  History  Trauma to the area: No  Recent illness:  No  Previous similar problem: No  Previous evaluation:  No  Precipitating or alleviating factors:  Aggravating factors include: lifting, exercise, and overuse  Therapies tried and outcome: rest/inactivity, immobilization, massage, stretching, acetaminophen, and Ibuprofen    Presents with concerns of increased pain to the medial aspect of right elbow. Has been occurring for the past month but worsened over the past 2 weeks. Currently in the process of a large [employer] project at home right now and has been doing a lot of yard work. Pain with flexion and extension of right elbow and wrist. Has been taking as needed acetaminophen/ibuprofen for pain relief. Also using topical CBD which has been helpful.     Denies weakness, diminished sensation, or temperature discrepancy in distal RUE.       Objective    /80   Pulse 80   Temp 97.5  F (36.4  C) (Temporal)   Resp 20   LMP 01/13/2020 (Approximate)   SpO2 98%   There is no height or weight on file to calculate BMI.  Physical Exam  Vitals reviewed.   Constitutional:       General: She is not in acute distress.     Appearance: Normal appearance. She is not ill-appearing.   HENT:      Head: Normocephalic and atraumatic.   Eyes:      Pupils: Pupils are equal, round, and reactive to light.   Cardiovascular:      Rate and Rhythm: Normal rate and regular rhythm.      Pulses:           Radial pulses are 2+ on the right side and 2+ on the left side.      Heart sounds: Normal heart sounds, S1 normal and S2 normal. No murmur heard.  Pulmonary:      Effort: Pulmonary effort is normal.      Breath sounds: Normal breath sounds. No wheezing.   Musculoskeletal:      Right shoulder: Normal. No deformity. Normal range of motion.      Right  elbow: No swelling. Normal range of motion. Tenderness present in medial epicondyle (Pain elicited with both flexion and extension of the wrist and elbow.).      Right forearm: Normal. No tenderness.      Right wrist: Normal. No tenderness. Normal range of motion.   Skin:     General: Skin is warm and dry.      Capillary Refill: Capillary refill takes less than 2 seconds.      Findings: No lesion or rash.   Neurological:      Mental Status: She is alert.      Sensory: Sensation is intact. No sensory deficit.      Motor: Motor function is intact. No weakness.   Psychiatric:         Attention and Perception: Attention and perception normal.         Mood and Affect: Mood and affect normal.          Signed Electronically by: EVIE Frey CNP

## 2024-08-06 NOTE — PATIENT INSTRUCTIONS
Based on your symptoms and aggravating features, I suspect that symptoms are related to inflammation of the tendon on the inner aspect of the elbow. This is often called Golfer's elbow. Is conservative with as needed acetaminophen (Tylenol) or ibuprofen (Advil). I would recommend applying ice frequently throughout the day to aid in reducing inflammation. You can also use a topical anti-inflammatory called diclofenac (Voltaren) gel up to 4 times per day for additional pain relief.     I would recommend applying a wrap (KT tape or over the counter wrap/brace) applied just below the elbow to take pressure off the tendon insertion point at the elbow.     I have placed a referral to [facility] physical therapy. I would recommend starting this in the next 2 weeks. I have included some exercises that can be completed at home in the meantime.    Yes

## 2024-08-18 DIAGNOSIS — I10 HYPERTENSION GOAL BP (BLOOD PRESSURE) < 140/90: ICD-10-CM

## 2024-08-19 RX ORDER — LISINOPRIL/HYDROCHLOROTHIAZIDE 10-12.5 MG
1 TABLET ORAL DAILY
Qty: 90 TABLET | Refills: 0 | Status: SHIPPED | OUTPATIENT
Start: 2024-08-19

## 2024-11-27 ENCOUNTER — OFFICE VISIT (OUTPATIENT)
Dept: FAMILY MEDICINE | Facility: OTHER | Age: 59
End: 2024-11-27
Payer: COMMERCIAL

## 2024-11-27 VITALS
HEIGHT: 62 IN | TEMPERATURE: 97.9 F | RESPIRATION RATE: 11 BRPM | BODY MASS INDEX: 24.37 KG/M2 | SYSTOLIC BLOOD PRESSURE: 156 MMHG | HEART RATE: 80 BPM | OXYGEN SATURATION: 99 % | DIASTOLIC BLOOD PRESSURE: 104 MMHG

## 2024-11-27 DIAGNOSIS — R10.32 LLQ ABDOMINAL PAIN: Primary | ICD-10-CM

## 2024-11-27 DIAGNOSIS — I10 HYPERTENSION GOAL BP (BLOOD PRESSURE) < 140/90: ICD-10-CM

## 2024-11-27 DIAGNOSIS — Z12.31 VISIT FOR SCREENING MAMMOGRAM: ICD-10-CM

## 2024-11-27 DIAGNOSIS — R10.31 RLQ ABDOMINAL PAIN: ICD-10-CM

## 2024-11-27 PROBLEM — N95.0 POSTMENOPAUSAL BLEEDING: Status: RESOLVED | Noted: 2024-02-20 | Resolved: 2024-11-27

## 2024-11-27 LAB
ALBUMIN SERPL BCG-MCNC: 4.3 G/DL (ref 3.5–5.2)
ALBUMIN UR-MCNC: NEGATIVE MG/DL
ALP SERPL-CCNC: 66 U/L (ref 40–150)
ALT SERPL W P-5'-P-CCNC: 18 U/L (ref 0–50)
ANION GAP SERPL CALCULATED.3IONS-SCNC: 12 MMOL/L (ref 7–15)
APPEARANCE UR: CLEAR
AST SERPL W P-5'-P-CCNC: 21 U/L (ref 0–45)
BILIRUB SERPL-MCNC: 0.3 MG/DL
BILIRUB UR QL STRIP: NEGATIVE
BUN SERPL-MCNC: 16.8 MG/DL (ref 6–20)
CALCIUM SERPL-MCNC: 8.8 MG/DL (ref 8.8–10.4)
CHLORIDE SERPL-SCNC: 102 MMOL/L (ref 98–107)
CHOLEST SERPL-MCNC: 187 MG/DL
COLOR UR AUTO: YELLOW
CREAT SERPL-MCNC: 1.09 MG/DL (ref 0.51–0.95)
EGFRCR SERPLBLD CKD-EPI 2021: 59 ML/MIN/1.73M2
ERYTHROCYTE [DISTWIDTH] IN BLOOD BY AUTOMATED COUNT: 12.3 % (ref 10–15)
FASTING STATUS PATIENT QL REPORTED: YES
FASTING STATUS PATIENT QL REPORTED: YES
GLUCOSE SERPL-MCNC: 89 MG/DL (ref 70–99)
GLUCOSE UR STRIP-MCNC: NEGATIVE MG/DL
HCO3 SERPL-SCNC: 25 MMOL/L (ref 22–29)
HCT VFR BLD AUTO: 41.3 % (ref 35–47)
HDLC SERPL-MCNC: 72 MG/DL
HGB BLD-MCNC: 14.1 G/DL (ref 11.7–15.7)
HGB UR QL STRIP: NEGATIVE
KETONES UR STRIP-MCNC: NEGATIVE MG/DL
LDLC SERPL CALC-MCNC: 92 MG/DL
LEUKOCYTE ESTERASE UR QL STRIP: ABNORMAL
MCH RBC QN AUTO: 32.6 PG (ref 26.5–33)
MCHC RBC AUTO-ENTMCNC: 34.1 G/DL (ref 31.5–36.5)
MCV RBC AUTO: 95 FL (ref 78–100)
NITRATE UR QL: NEGATIVE
NONHDLC SERPL-MCNC: 115 MG/DL
PH UR STRIP: 7 [PH] (ref 5–7)
PLATELET # BLD AUTO: 245 10E3/UL (ref 150–450)
POTASSIUM SERPL-SCNC: 4.5 MMOL/L (ref 3.4–5.3)
PROT SERPL-MCNC: 6.9 G/DL (ref 6.4–8.3)
RBC # BLD AUTO: 4.33 10E6/UL (ref 3.8–5.2)
RBC #/AREA URNS AUTO: ABNORMAL /HPF
SODIUM SERPL-SCNC: 139 MMOL/L (ref 135–145)
SP GR UR STRIP: 1.02 (ref 1–1.03)
SQUAMOUS #/AREA URNS AUTO: ABNORMAL /LPF
TRIGL SERPL-MCNC: 113 MG/DL
TSH SERPL DL<=0.005 MIU/L-ACNC: 1.5 UIU/ML (ref 0.3–4.2)
UROBILINOGEN UR STRIP-ACNC: 0.2 E.U./DL
WBC # BLD AUTO: 7.4 10E3/UL (ref 4–11)
WBC #/AREA URNS AUTO: ABNORMAL /HPF

## 2024-11-27 PROCEDURE — 80061 LIPID PANEL: CPT | Performed by: FAMILY MEDICINE

## 2024-11-27 PROCEDURE — 81001 URINALYSIS AUTO W/SCOPE: CPT | Performed by: FAMILY MEDICINE

## 2024-11-27 PROCEDURE — 36415 COLL VENOUS BLD VENIPUNCTURE: CPT | Performed by: FAMILY MEDICINE

## 2024-11-27 PROCEDURE — 84443 ASSAY THYROID STIM HORMONE: CPT | Performed by: FAMILY MEDICINE

## 2024-11-27 PROCEDURE — 99214 OFFICE O/P EST MOD 30 MIN: CPT | Performed by: FAMILY MEDICINE

## 2024-11-27 PROCEDURE — 85027 COMPLETE CBC AUTOMATED: CPT | Performed by: FAMILY MEDICINE

## 2024-11-27 PROCEDURE — 80053 COMPREHEN METABOLIC PANEL: CPT | Performed by: FAMILY MEDICINE

## 2024-11-27 RX ORDER — LISINOPRIL AND HYDROCHLOROTHIAZIDE 10; 12.5 MG/1; MG/1
1 TABLET ORAL DAILY
Qty: 90 TABLET | Refills: 0 | Status: SHIPPED | OUTPATIENT
Start: 2024-11-27

## 2024-11-27 ASSESSMENT — PAIN SCALES - GENERAL: PAINLEVEL_OUTOF10: NO PAIN (0)

## 2024-11-27 NOTE — PROGRESS NOTES
Assessment & Plan     LLQ abdominal pain/RLQ abdominal pain  She has been having intermittent right and left lower quadrant pain a couple times a month or every other month that lasts up to 10 minutes at a time.  It is not associated with any nausea, vomiting, diarrhea, constipation or urinary symptoms.  Pain resolves on its own.  She states the pain can be quite intense.  No hernias were found on exam which is what the patient thought it might be.  She does have known fibroids.  She denies any further vaginal bleeding since she had her hysteroscopy and had the polyp removed.  At this time I recommend that we get imaging.  If her CT is negative would then consider pelvic ultrasound.  If these are negative consider colonoscopy.  She otherwise is not due for colonoscopy until 2028.    - CT Abdomen Pelvis w/o & w Contrast; Future    Hypertension goal BP (blood pressure) < 140/90  She has her blood pressure checked at work weekly and states they have all been normal.  However she does admit that she has not been taking her medication on a regular basis.  She did take it today about a half an hour before she came.  Repeat blood pressure came down a little bit but was still elevated.  She denies headache, chest pain or shortness of breath.  She will set a reminder on her phone to help her remember to take her medication.  She will continue to check her blood pressures and will send me [employer] messages.  If her blood pressure continues to be elevated would then double the dose of her current blood pressure medication.  Will also check labs and urine today.    - lisinopril-hydrochlorothiazide (ZESTORETIC) 10-12.5 MG tablet; Take 1 tablet by mouth daily.  - Comprehensive metabolic panel (BMP + Alb, Alk Phos, ALT, AST, Total. Bili, TP)  - CBC with platelets  - TSH with free T4 reflex  - UA Macroscopic with reflex to Microscopic and Culture - Lab Collect  - Lipid panel reflex to direct LDL Fasting    Visit for screening  "mammogram  - MA Screening Bilateral w/ Martín; Future    Subjective   Nona is a 59 year old, presenting for the following health issues:  Hernia        11/27/2024     8:13 AM   Additional Questions   Roomed by lee   Accompanied by self     History of Present Illness       Reason for visit:  Possible hernia  Symptom onset:  More than a month  Symptoms include:  Abdominal pain  Symptom intensity:  Severe  Symptom progression:  Staying the same  Had these symptoms before:  Yes  Has tried/received treatment for these symptoms:  No  What makes it worse:  Core worokut She is missing 5 dose(s) of medications per week.  She is not taking prescribed medications regularly due to other.     A few years ago was on a plane and had a severe pain in her LLQ, pain went away and didn't come back.  Now states that she gets intermittent right and left lower quadrant pain that is just as severe.  Happens with sit-ups, but also with just sitting.  Pain lasts from 2-10 minutes.  Happens couple times a month to every other month.  Denies nausea, vomiting, diarrhea, constipation, fever, chills, flatus.      Hypertension Follow-up    Do you check your blood pressure regularly outside of the clinic? Yes   Are you following a low salt diet? No  Are your blood pressures ever more than 140 on the top number (systolic) OR more   than 90 on the bottom number (diastolic), for example 140/90? No--just took meds 30 minutes ago        Objective    BP (!) 166/114   Pulse 80   Temp 97.9  F (36.6  C) (Temporal)   Resp 11   Ht 1.585 m (5' 2.4\")   LMP 01/13/2020 (Approximate)   SpO2 99%   BMI 24.37 kg/m    Body mass index is 24.37 kg/m .  Physical Exam   Gen: no apparent distress  NECK: no adenopathy, no asymmetry, no masses  Chest: clear to auscultation without wheeze, rale or rhonchi  Cor: regular rate and rhythm without murmur  ABDOMEN: mild tenderness in the RLQ and LLQ area, without rebound, guarding, mass or organomegaly. Abdomen is soft " and bowel sounds are normal.   Ext: warm and dry without edema  Psych: Alert and oriented times 3; coherent speech, normal   rate and volume, able to articulate logical thoughts, able   to abstract reason, no tangential thoughts, no hallucinations   or delusions  Her affect is neutral           Signed Electronically by: Lin Lanza MD

## 2024-12-05 ENCOUNTER — TRANSFERRED RECORDS (OUTPATIENT)
Dept: HEALTH INFORMATION MANAGEMENT | Facility: CLINIC | Age: 59
End: 2024-12-05
Payer: COMMERCIAL

## 2024-12-21 ENCOUNTER — HEALTH MAINTENANCE LETTER (OUTPATIENT)
Age: 59
End: 2024-12-21

## (undated) DEVICE — GLOVE BIOGEL PI MICRO SZ 7.5 48575

## (undated) DEVICE — PAD CHUX UNDERPAD 23X24" 7136

## (undated) DEVICE — PREP DYNA-HEX 4% CHG SCRUB 4OZ BOTTLE MDS098710

## (undated) DEVICE — DRAPE GYN/UROLOGY FLUID POUCH TUR 29455

## (undated) DEVICE — KIT PROCEDURE FLUENT IN/OUT FLOWPAK TISS TRAP FLT-112S

## (undated) DEVICE — KIT ENDO FIRST STEP DISINFECTANT 200ML W/POUCH EP-4

## (undated) DEVICE — PACK MINOR SBA15MIFSE

## (undated) DEVICE — CATH INTERMITTENT CLEAN-CATH 16FR 16" VINYL LF 421716

## (undated) DEVICE — PAD PERI W/WINGS 1580A

## (undated) DEVICE — NDL 19GA 1.5"

## (undated) DEVICE — SYR 50ML LL W/O NDL 309653

## (undated) DEVICE — SEAL SET MYOSURE ROD LENS SCOPE SINGLE USE 40-902

## (undated) DEVICE — PREP SKIN SCRUB TRAY 4461A

## (undated) DEVICE — SOL NACL 0.9% IRRIG 3000ML BAG 07972-08

## (undated) DEVICE — NDL SPINAL 22GA 3.5" QUINCKE 405181

## (undated) DEVICE — SOL WATER IRRIG 1000ML BOTTLE 07139-09

## (undated) DEVICE — DRSG TELFA 3X8" 1238

## (undated) DEVICE — GLOVE BIOGEL PI MICRO INDICATOR UNDERGLOVE SZ 8.0 48980

## (undated) RX ORDER — PROPOFOL 10 MG/ML
INJECTION, EMULSION INTRAVENOUS
Status: DISPENSED
Start: 2024-02-23

## (undated) RX ORDER — FENTANYL CITRATE 50 UG/ML
INJECTION, SOLUTION INTRAMUSCULAR; INTRAVENOUS
Status: DISPENSED
Start: 2024-02-23

## (undated) RX ORDER — KETOROLAC TROMETHAMINE 30 MG/ML
INJECTION, SOLUTION INTRAMUSCULAR; INTRAVENOUS
Status: DISPENSED
Start: 2024-02-23

## (undated) RX ORDER — ACETAMINOPHEN 325 MG/1
TABLET ORAL
Status: DISPENSED
Start: 2024-02-23